# Patient Record
Sex: FEMALE | Race: WHITE
[De-identification: names, ages, dates, MRNs, and addresses within clinical notes are randomized per-mention and may not be internally consistent; named-entity substitution may affect disease eponyms.]

---

## 2017-05-09 ENCOUNTER — HOSPITAL ENCOUNTER (OUTPATIENT)
Dept: HOSPITAL 8 - RAD | Age: 62
Discharge: HOME | End: 2017-05-09
Attending: PHYSICIAN ASSISTANT
Payer: COMMERCIAL

## 2017-05-09 DIAGNOSIS — Z98.890: ICD-10-CM

## 2017-05-09 DIAGNOSIS — M48.07: ICD-10-CM

## 2017-05-09 DIAGNOSIS — M41.84: ICD-10-CM

## 2017-05-09 DIAGNOSIS — M48.04: ICD-10-CM

## 2017-05-09 DIAGNOSIS — Z98.1: ICD-10-CM

## 2017-05-09 DIAGNOSIS — M43.8X4: ICD-10-CM

## 2017-05-09 DIAGNOSIS — M47.894: ICD-10-CM

## 2017-05-09 DIAGNOSIS — M47.897: Primary | ICD-10-CM

## 2017-05-09 PROCEDURE — 72110 X-RAY EXAM L-2 SPINE 4/>VWS: CPT

## 2017-05-09 PROCEDURE — 72072 X-RAY EXAM THORAC SPINE 3VWS: CPT

## 2017-05-30 ENCOUNTER — HOSPITAL ENCOUNTER (EMERGENCY)
Dept: HOSPITAL 8 - ED | Age: 62
Discharge: HOME | End: 2017-05-30
Payer: COMMERCIAL

## 2017-05-30 VITALS — HEIGHT: 68 IN | BODY MASS INDEX: 24.06 KG/M2 | WEIGHT: 158.73 LBS

## 2017-05-30 VITALS — SYSTOLIC BLOOD PRESSURE: 133 MMHG | DIASTOLIC BLOOD PRESSURE: 70 MMHG

## 2017-05-30 DIAGNOSIS — N10: Primary | ICD-10-CM

## 2017-05-30 DIAGNOSIS — I10: ICD-10-CM

## 2017-05-30 DIAGNOSIS — F17.200: ICD-10-CM

## 2017-05-30 LAB
AST SERPL-CCNC: 14 U/L (ref 15–37)
BUN SERPL-MCNC: 19 MG/DL (ref 7–18)
PATH.CAST-FLAG: (no result)
SPERM-FLAG: (no result)
SRC-FLAG: (no result)
XTAL-FLAG: (no result)
YLC-FLAG: (no result)

## 2017-05-30 PROCEDURE — 81001 URINALYSIS AUTO W/SCOPE: CPT

## 2017-05-30 PROCEDURE — 87186 SC STD MICRODIL/AGAR DIL: CPT

## 2017-05-30 PROCEDURE — 96375 TX/PRO/DX INJ NEW DRUG ADDON: CPT

## 2017-05-30 PROCEDURE — 87077 CULTURE AEROBIC IDENTIFY: CPT

## 2017-05-30 PROCEDURE — 83690 ASSAY OF LIPASE: CPT

## 2017-05-30 PROCEDURE — 83605 ASSAY OF LACTIC ACID: CPT

## 2017-05-30 PROCEDURE — 82010 KETONE BODYS QUAN: CPT

## 2017-05-30 PROCEDURE — 96365 THER/PROPH/DIAG IV INF INIT: CPT

## 2017-05-30 PROCEDURE — 74177 CT ABD & PELVIS W/CONTRAST: CPT

## 2017-05-30 PROCEDURE — 80053 COMPREHEN METABOLIC PANEL: CPT

## 2017-05-30 PROCEDURE — 87086 URINE CULTURE/COLONY COUNT: CPT

## 2017-05-30 PROCEDURE — 96376 TX/PRO/DX INJ SAME DRUG ADON: CPT

## 2017-05-30 PROCEDURE — 99285 EMERGENCY DEPT VISIT HI MDM: CPT

## 2017-05-30 PROCEDURE — 85025 COMPLETE CBC W/AUTO DIFF WBC: CPT

## 2017-05-30 PROCEDURE — 96361 HYDRATE IV INFUSION ADD-ON: CPT

## 2017-05-30 PROCEDURE — 36415 COLL VENOUS BLD VENIPUNCTURE: CPT

## 2017-05-30 RX ADMIN — HYDROMORPHONE HYDROCHLORIDE PRN MG: 1 INJECTION, SOLUTION INTRAMUSCULAR; INTRAVENOUS; SUBCUTANEOUS at 12:42

## 2017-05-30 RX ADMIN — HYDROMORPHONE HYDROCHLORIDE PRN MG: 1 INJECTION, SOLUTION INTRAMUSCULAR; INTRAVENOUS; SUBCUTANEOUS at 11:44

## 2017-06-03 ENCOUNTER — HOSPITAL ENCOUNTER (INPATIENT)
Dept: HOSPITAL 8 - ED | Age: 62
LOS: 10 days | Discharge: HOME | DRG: 291 | End: 2017-06-13
Attending: INTERNAL MEDICINE | Admitting: INTERNAL MEDICINE
Payer: COMMERCIAL

## 2017-06-03 VITALS — SYSTOLIC BLOOD PRESSURE: 158 MMHG | DIASTOLIC BLOOD PRESSURE: 75 MMHG

## 2017-06-03 VITALS — SYSTOLIC BLOOD PRESSURE: 158 MMHG | DIASTOLIC BLOOD PRESSURE: 65 MMHG

## 2017-06-03 VITALS — WEIGHT: 166.01 LBS | HEIGHT: 68 IN | BODY MASS INDEX: 25.16 KG/M2

## 2017-06-03 DIAGNOSIS — I13.2: Primary | ICD-10-CM

## 2017-06-03 DIAGNOSIS — E11.649: ICD-10-CM

## 2017-06-03 DIAGNOSIS — F17.210: ICD-10-CM

## 2017-06-03 DIAGNOSIS — N12: ICD-10-CM

## 2017-06-03 DIAGNOSIS — Z66: ICD-10-CM

## 2017-06-03 DIAGNOSIS — D64.9: ICD-10-CM

## 2017-06-03 DIAGNOSIS — E86.0: ICD-10-CM

## 2017-06-03 DIAGNOSIS — E87.2: ICD-10-CM

## 2017-06-03 DIAGNOSIS — E11.00: ICD-10-CM

## 2017-06-03 DIAGNOSIS — Z79.899: ICD-10-CM

## 2017-06-03 DIAGNOSIS — E78.5: ICD-10-CM

## 2017-06-03 DIAGNOSIS — E11.65: ICD-10-CM

## 2017-06-03 DIAGNOSIS — E83.39: ICD-10-CM

## 2017-06-03 DIAGNOSIS — Z95.5: ICD-10-CM

## 2017-06-03 DIAGNOSIS — Z87.440: ICD-10-CM

## 2017-06-03 DIAGNOSIS — I25.10: ICD-10-CM

## 2017-06-03 DIAGNOSIS — N31.9: ICD-10-CM

## 2017-06-03 DIAGNOSIS — Z90.49: ICD-10-CM

## 2017-06-03 DIAGNOSIS — N18.6: ICD-10-CM

## 2017-06-03 DIAGNOSIS — E87.5: ICD-10-CM

## 2017-06-03 DIAGNOSIS — Z87.81: ICD-10-CM

## 2017-06-03 DIAGNOSIS — E11.42: ICD-10-CM

## 2017-06-03 DIAGNOSIS — E87.0: ICD-10-CM

## 2017-06-03 DIAGNOSIS — Z85.41: ICD-10-CM

## 2017-06-03 DIAGNOSIS — K21.9: ICD-10-CM

## 2017-06-03 DIAGNOSIS — K86.1: ICD-10-CM

## 2017-06-03 DIAGNOSIS — E11.22: ICD-10-CM

## 2017-06-03 DIAGNOSIS — N18.3: ICD-10-CM

## 2017-06-03 DIAGNOSIS — Z84.1: ICD-10-CM

## 2017-06-03 DIAGNOSIS — Z79.4: ICD-10-CM

## 2017-06-03 DIAGNOSIS — E43: ICD-10-CM

## 2017-06-03 DIAGNOSIS — Z98.51: ICD-10-CM

## 2017-06-03 DIAGNOSIS — I50.32: ICD-10-CM

## 2017-06-03 DIAGNOSIS — G89.29: ICD-10-CM

## 2017-06-03 DIAGNOSIS — B96.20: ICD-10-CM

## 2017-06-03 DIAGNOSIS — I34.0: ICD-10-CM

## 2017-06-03 DIAGNOSIS — E87.1: ICD-10-CM

## 2017-06-03 LAB
AST SERPL-CCNC: 25 U/L (ref 15–37)
BUN SERPL-MCNC: 47 MG/DL (ref 7–18)

## 2017-06-03 PROCEDURE — 83036 HEMOGLOBIN GLYCOSYLATED A1C: CPT

## 2017-06-03 PROCEDURE — 81001 URINALYSIS AUTO W/SCOPE: CPT

## 2017-06-03 PROCEDURE — 83550 IRON BINDING TEST: CPT

## 2017-06-03 PROCEDURE — 83605 ASSAY OF LACTIC ACID: CPT

## 2017-06-03 PROCEDURE — 83735 ASSAY OF MAGNESIUM: CPT

## 2017-06-03 PROCEDURE — 86707 HEPATITIS BE ANTIBODY: CPT

## 2017-06-03 PROCEDURE — 36558 INSERT TUNNELED CV CATH: CPT

## 2017-06-03 PROCEDURE — 80048 BASIC METABOLIC PNL TOTAL CA: CPT

## 2017-06-03 PROCEDURE — 85610 PROTHROMBIN TIME: CPT

## 2017-06-03 PROCEDURE — 50200 RENAL BIOPSY PERQ: CPT

## 2017-06-03 PROCEDURE — C1750 CATH, HEMODIALYSIS,LONG-TERM: HCPCS

## 2017-06-03 PROCEDURE — 87350 HEPATITIS BE AG IA: CPT

## 2017-06-03 PROCEDURE — 83690 ASSAY OF LIPASE: CPT

## 2017-06-03 PROCEDURE — C1894 INTRO/SHEATH, NON-LASER: HCPCS

## 2017-06-03 PROCEDURE — 84550 ASSAY OF BLOOD/URIC ACID: CPT

## 2017-06-03 PROCEDURE — 89055 LEUKOCYTE ASSESSMENT FECAL: CPT

## 2017-06-03 PROCEDURE — 80053 COMPREHEN METABOLIC PANEL: CPT

## 2017-06-03 PROCEDURE — 83930 ASSAY OF BLOOD OSMOLALITY: CPT

## 2017-06-03 PROCEDURE — 96361 HYDRATE IV INFUSION ADD-ON: CPT

## 2017-06-03 PROCEDURE — 86803 HEPATITIS C AB TEST: CPT

## 2017-06-03 PROCEDURE — 82550 ASSAY OF CK (CPK): CPT

## 2017-06-03 PROCEDURE — 82306 VITAMIN D 25 HYDROXY: CPT

## 2017-06-03 PROCEDURE — 87324 CLOSTRIDIUM AG IA: CPT

## 2017-06-03 PROCEDURE — 86480 TB TEST CELL IMMUN MEASURE: CPT

## 2017-06-03 PROCEDURE — 83880 ASSAY OF NATRIURETIC PEPTIDE: CPT

## 2017-06-03 PROCEDURE — 76770 US EXAM ABDO BACK WALL COMP: CPT

## 2017-06-03 PROCEDURE — 87040 BLOOD CULTURE FOR BACTERIA: CPT

## 2017-06-03 PROCEDURE — 76937 US GUIDE VASCULAR ACCESS: CPT

## 2017-06-03 PROCEDURE — 93005 ELECTROCARDIOGRAM TRACING: CPT

## 2017-06-03 PROCEDURE — 87077 CULTURE AEROBIC IDENTIFY: CPT

## 2017-06-03 PROCEDURE — 83970 ASSAY OF PARATHORMONE: CPT

## 2017-06-03 PROCEDURE — 82962 GLUCOSE BLOOD TEST: CPT

## 2017-06-03 PROCEDURE — 88300 SURGICAL PATH GROSS: CPT

## 2017-06-03 PROCEDURE — 84295 ASSAY OF SERUM SODIUM: CPT

## 2017-06-03 PROCEDURE — 77012 CT SCAN FOR NEEDLE BIOPSY: CPT

## 2017-06-03 PROCEDURE — 86705 HEP B CORE ANTIBODY IGM: CPT

## 2017-06-03 PROCEDURE — 82728 ASSAY OF FERRITIN: CPT

## 2017-06-03 PROCEDURE — 74022 RADEX COMPL AQT ABD SERIES: CPT

## 2017-06-03 PROCEDURE — 87086 URINE CULTURE/COLONY COUNT: CPT

## 2017-06-03 PROCEDURE — 0T9B70Z DRAINAGE OF BLADDER WITH DRAINAGE DEVICE, VIA NATURAL OR ARTIFICIAL OPENING: ICD-10-PCS | Performed by: RADIOLOGY

## 2017-06-03 PROCEDURE — 99156 MOD SED OTH PHYS/QHP 5/>YRS: CPT

## 2017-06-03 PROCEDURE — 96374 THER/PROPH/DIAG INJ IV PUSH: CPT

## 2017-06-03 PROCEDURE — 77001 FLUOROGUIDE FOR VEIN DEVICE: CPT

## 2017-06-03 PROCEDURE — 83540 ASSAY OF IRON: CPT

## 2017-06-03 PROCEDURE — 96375 TX/PRO/DX INJ NEW DRUG ADDON: CPT

## 2017-06-03 PROCEDURE — 36415 COLL VENOUS BLD VENIPUNCTURE: CPT

## 2017-06-03 PROCEDURE — 83935 ASSAY OF URINE OSMOLALITY: CPT

## 2017-06-03 PROCEDURE — 85025 COMPLETE CBC W/AUTO DIFF WBC: CPT

## 2017-06-03 PROCEDURE — S0028 INJECTION, FAMOTIDINE, 20 MG: HCPCS

## 2017-06-03 PROCEDURE — 99157 MOD SED OTHER PHYS/QHP EA: CPT

## 2017-06-03 PROCEDURE — 87186 SC STD MICRODIL/AGAR DIL: CPT

## 2017-06-03 PROCEDURE — 84100 ASSAY OF PHOSPHORUS: CPT

## 2017-06-03 PROCEDURE — 84300 ASSAY OF URINE SODIUM: CPT

## 2017-06-03 PROCEDURE — C1769 GUIDE WIRE: HCPCS

## 2017-06-03 RX ADMIN — METHOCARBAMOL PRN MG: 500 TABLET ORAL at 22:48

## 2017-06-03 RX ADMIN — GABAPENTIN SCH MG: 300 CAPSULE ORAL at 22:48

## 2017-06-03 RX ADMIN — HEPARIN SODIUM SCH UNITS: 5000 INJECTION, SOLUTION INTRAVENOUS; SUBCUTANEOUS at 22:47

## 2017-06-03 RX ADMIN — CYCLOBENZAPRINE HYDROCHLORIDE SCH MG: 10 TABLET, FILM COATED ORAL at 22:48

## 2017-06-03 RX ADMIN — CEFTRIAXONE SCH MLS/HR: 1 INJECTION, SOLUTION INTRAVENOUS at 22:47

## 2017-06-03 RX ADMIN — CARVEDILOL SCH MG: 6.25 TABLET, FILM COATED ORAL at 22:48

## 2017-06-03 RX ADMIN — HYDROCODONE BITARTRATE AND ACETAMINOPHEN PRN TAB: 10; 325 TABLET ORAL at 22:02

## 2017-06-03 RX ADMIN — FENOFIBRATE SCH MG: 145 TABLET, FILM COATED ORAL at 22:48

## 2017-06-03 RX ADMIN — SODIUM CHLORIDE SCH MLS/HR: 0.9 INJECTION, SOLUTION INTRAVENOUS at 22:59

## 2017-06-04 VITALS — SYSTOLIC BLOOD PRESSURE: 118 MMHG | DIASTOLIC BLOOD PRESSURE: 63 MMHG

## 2017-06-04 VITALS — DIASTOLIC BLOOD PRESSURE: 70 MMHG | SYSTOLIC BLOOD PRESSURE: 134 MMHG

## 2017-06-04 VITALS — SYSTOLIC BLOOD PRESSURE: 145 MMHG | DIASTOLIC BLOOD PRESSURE: 69 MMHG

## 2017-06-04 VITALS — DIASTOLIC BLOOD PRESSURE: 73 MMHG | SYSTOLIC BLOOD PRESSURE: 149 MMHG

## 2017-06-04 VITALS — DIASTOLIC BLOOD PRESSURE: 84 MMHG | SYSTOLIC BLOOD PRESSURE: 141 MMHG

## 2017-06-04 LAB
AST SERPL-CCNC: 17 U/L (ref 15–37)
BUN SERPL-MCNC: 50 MG/DL (ref 7–18)

## 2017-06-04 RX ADMIN — AMLODIPINE BESYLATE SCH MG: 5 TABLET ORAL at 10:21

## 2017-06-04 RX ADMIN — ACETAMINOPHEN PRN MG: 325 TABLET, FILM COATED ORAL at 02:52

## 2017-06-04 RX ADMIN — INSULIN HUMAN SCH UNITS: 100 INJECTION, SUSPENSION SUBCUTANEOUS at 10:21

## 2017-06-04 RX ADMIN — HYDROCODONE BITARTRATE AND ACETAMINOPHEN PRN TAB: 10; 325 TABLET ORAL at 14:09

## 2017-06-04 RX ADMIN — SODIUM CHLORIDE, PRESERVATIVE FREE SCH ML: 5 INJECTION INTRAVENOUS at 10:21

## 2017-06-04 RX ADMIN — CARVEDILOL SCH MG: 6.25 TABLET, FILM COATED ORAL at 20:54

## 2017-06-04 RX ADMIN — GABAPENTIN SCH MG: 300 CAPSULE ORAL at 20:55

## 2017-06-04 RX ADMIN — METHOCARBAMOL PRN MG: 500 TABLET ORAL at 22:37

## 2017-06-04 RX ADMIN — HEPARIN SODIUM SCH UNITS: 5000 INJECTION, SOLUTION INTRAVENOUS; SUBCUTANEOUS at 14:09

## 2017-06-04 RX ADMIN — CARVEDILOL SCH MG: 6.25 TABLET, FILM COATED ORAL at 10:21

## 2017-06-04 RX ADMIN — INSULIN HUMAN SCH UNITS: 100 INJECTION, SUSPENSION SUBCUTANEOUS at 11:57

## 2017-06-04 RX ADMIN — HYDROCODONE BITARTRATE AND ACETAMINOPHEN PRN TAB: 10; 325 TABLET ORAL at 22:37

## 2017-06-04 RX ADMIN — DOCUSATE SODIUM 50MG AND SENNOSIDES 8.6MG SCH TAB: 8.6; 5 TABLET, FILM COATED ORAL at 10:21

## 2017-06-04 RX ADMIN — NICOTINE SCH PATCH: 21 PATCH, EXTENDED RELEASE TRANSDERMAL at 20:54

## 2017-06-04 RX ADMIN — SODIUM CHLORIDE SCH MLS/HR: 0.9 INJECTION, SOLUTION INTRAVENOUS at 18:12

## 2017-06-04 RX ADMIN — CEFTRIAXONE SCH MLS/HR: 1 INJECTION, SOLUTION INTRAVENOUS at 22:37

## 2017-06-04 RX ADMIN — CYCLOBENZAPRINE HYDROCHLORIDE SCH MG: 10 TABLET, FILM COATED ORAL at 20:55

## 2017-06-04 RX ADMIN — Medication SCH TAB: at 13:32

## 2017-06-04 RX ADMIN — HEPARIN SODIUM SCH UNITS: 5000 INJECTION, SOLUTION INTRAVENOUS; SUBCUTANEOUS at 05:47

## 2017-06-04 RX ADMIN — FENOFIBRATE SCH MG: 145 TABLET, FILM COATED ORAL at 20:54

## 2017-06-04 RX ADMIN — INSULIN HUMAN SCH UNITS: 100 INJECTION, SUSPENSION SUBCUTANEOUS at 00:44

## 2017-06-04 RX ADMIN — METHOCARBAMOL PRN MG: 500 TABLET ORAL at 20:56

## 2017-06-04 RX ADMIN — SODIUM CHLORIDE, PRESERVATIVE FREE SCH ML: 5 INJECTION INTRAVENOUS at 20:54

## 2017-06-04 RX ADMIN — HEPARIN SODIUM SCH UNITS: 5000 INJECTION, SOLUTION INTRAVENOUS; SUBCUTANEOUS at 22:38

## 2017-06-04 RX ADMIN — SODIUM CHLORIDE SCH MLS/HR: 0.9 INJECTION, SOLUTION INTRAVENOUS at 10:20

## 2017-06-05 VITALS — SYSTOLIC BLOOD PRESSURE: 136 MMHG | DIASTOLIC BLOOD PRESSURE: 75 MMHG

## 2017-06-05 LAB
AST SERPL-CCNC: 19 U/L (ref 15–37)
BUN SERPL-MCNC: 52 MG/DL (ref 7–18)

## 2017-06-05 RX ADMIN — HYDROCODONE BITARTRATE AND ACETAMINOPHEN PRN TAB: 10; 325 TABLET ORAL at 14:30

## 2017-06-05 RX ADMIN — HYDROCODONE BITARTRATE AND ACETAMINOPHEN PRN TAB: 10; 325 TABLET ORAL at 22:45

## 2017-06-05 RX ADMIN — CARVEDILOL SCH MG: 6.25 TABLET, FILM COATED ORAL at 21:00

## 2017-06-05 RX ADMIN — HYDROCODONE BITARTRATE AND ACETAMINOPHEN PRN TAB: 10; 325 TABLET ORAL at 06:30

## 2017-06-05 RX ADMIN — HEPARIN SODIUM SCH UNITS: 5000 INJECTION, SOLUTION INTRAVENOUS; SUBCUTANEOUS at 14:25

## 2017-06-05 RX ADMIN — HEPARIN SODIUM SCH UNITS: 5000 INJECTION, SOLUTION INTRAVENOUS; SUBCUTANEOUS at 06:30

## 2017-06-05 RX ADMIN — CYCLOBENZAPRINE HYDROCHLORIDE SCH MG: 10 TABLET, FILM COATED ORAL at 21:00

## 2017-06-05 RX ADMIN — Medication SCH TAB: at 09:18

## 2017-06-05 RX ADMIN — SODIUM CHLORIDE, PRESERVATIVE FREE SCH ML: 5 INJECTION INTRAVENOUS at 09:18

## 2017-06-05 RX ADMIN — CARVEDILOL SCH MG: 6.25 TABLET, FILM COATED ORAL at 09:18

## 2017-06-05 RX ADMIN — DOCUSATE SODIUM 50MG AND SENNOSIDES 8.6MG SCH TAB: 8.6; 5 TABLET, FILM COATED ORAL at 09:18

## 2017-06-05 RX ADMIN — SODIUM CHLORIDE SCH MLS/HR: 0.9 INJECTION, SOLUTION INTRAVENOUS at 14:30

## 2017-06-05 RX ADMIN — GABAPENTIN SCH MG: 300 CAPSULE ORAL at 21:00

## 2017-06-05 RX ADMIN — AMLODIPINE BESYLATE SCH MG: 5 TABLET ORAL at 09:18

## 2017-06-05 RX ADMIN — NICOTINE SCH PATCH: 21 PATCH, EXTENDED RELEASE TRANSDERMAL at 21:00

## 2017-06-05 RX ADMIN — SODIUM CHLORIDE SCH MLS/HR: 0.9 INJECTION, SOLUTION INTRAVENOUS at 04:39

## 2017-06-05 RX ADMIN — FENOFIBRATE SCH MG: 145 TABLET, FILM COATED ORAL at 21:00

## 2017-06-05 RX ADMIN — METHOCARBAMOL PRN MG: 500 TABLET ORAL at 21:00

## 2017-06-05 RX ADMIN — SODIUM CHLORIDE, PRESERVATIVE FREE SCH ML: 5 INJECTION INTRAVENOUS at 21:00

## 2017-06-06 VITALS — SYSTOLIC BLOOD PRESSURE: 152 MMHG | DIASTOLIC BLOOD PRESSURE: 69 MMHG

## 2017-06-06 VITALS — SYSTOLIC BLOOD PRESSURE: 157 MMHG | DIASTOLIC BLOOD PRESSURE: 81 MMHG

## 2017-06-06 PROCEDURE — 02HV33Z INSERTION OF INFUSION DEVICE INTO SUPERIOR VENA CAVA, PERCUTANEOUS APPROACH: ICD-10-PCS | Performed by: RADIOLOGY

## 2017-06-06 PROCEDURE — B548ZZA ULTRASONOGRAPHY OF SUPERIOR VENA CAVA, GUIDANCE: ICD-10-PCS | Performed by: RADIOLOGY

## 2017-06-06 PROCEDURE — B5181ZA FLUOROSCOPY OF SUPERIOR VENA CAVA USING LOW OSMOLAR CONTRAST, GUIDANCE: ICD-10-PCS | Performed by: RADIOLOGY

## 2017-06-06 PROCEDURE — 5A1D60Z: ICD-10-PCS | Performed by: RADIOLOGY

## 2017-06-06 RX ADMIN — Medication SCH TAB: at 09:00

## 2017-06-06 RX ADMIN — GABAPENTIN SCH MG: 300 CAPSULE ORAL at 22:31

## 2017-06-06 RX ADMIN — SODIUM CHLORIDE SCH MLS/HR: 0.9 INJECTION, SOLUTION INTRAVENOUS at 10:30

## 2017-06-06 RX ADMIN — HEPARIN SODIUM SCH UNITS: 5000 INJECTION, SOLUTION INTRAVENOUS; SUBCUTANEOUS at 14:30

## 2017-06-06 RX ADMIN — DEXTROSE SCH MLS/HR: 10 SOLUTION INTRAVENOUS at 16:01

## 2017-06-06 RX ADMIN — CARVEDILOL SCH MG: 6.25 TABLET, FILM COATED ORAL at 09:00

## 2017-06-06 RX ADMIN — CEFTRIAXONE SCH MLS/HR: 1 INJECTION, SOLUTION INTRAVENOUS at 01:00

## 2017-06-06 RX ADMIN — SODIUM CHLORIDE, PRESERVATIVE FREE SCH ML: 5 INJECTION INTRAVENOUS at 22:31

## 2017-06-06 RX ADMIN — DOCUSATE SODIUM 50MG AND SENNOSIDES 8.6MG SCH TAB: 8.6; 5 TABLET, FILM COATED ORAL at 09:00

## 2017-06-06 RX ADMIN — CALCIUM ACETATE SCH MG: 667 CAPSULE ORAL at 17:00

## 2017-06-06 RX ADMIN — SODIUM CHLORIDE SCH MLS/HR: 0.9 INJECTION, SOLUTION INTRAVENOUS at 00:30

## 2017-06-06 RX ADMIN — AMLODIPINE BESYLATE SCH MG: 5 TABLET ORAL at 09:00

## 2017-06-06 RX ADMIN — CALCIUM ACETATE SCH MG: 667 CAPSULE ORAL at 16:01

## 2017-06-06 RX ADMIN — FENOFIBRATE SCH MG: 145 TABLET, FILM COATED ORAL at 22:31

## 2017-06-06 RX ADMIN — SODIUM CHLORIDE, PRESERVATIVE FREE SCH ML: 5 INJECTION INTRAVENOUS at 09:00

## 2017-06-06 RX ADMIN — HEPARIN SODIUM SCH UNITS: 5000 INJECTION, SOLUTION INTRAVENOUS; SUBCUTANEOUS at 22:31

## 2017-06-06 RX ADMIN — CEFTRIAXONE SCH MLS/HR: 1 INJECTION, SOLUTION INTRAVENOUS at 22:32

## 2017-06-06 RX ADMIN — HYDROCODONE BITARTRATE AND ACETAMINOPHEN PRN TAB: 10; 325 TABLET ORAL at 15:32

## 2017-06-06 RX ADMIN — HEPARIN SODIUM SCH UNITS: 5000 INJECTION, SOLUTION INTRAVENOUS; SUBCUTANEOUS at 01:00

## 2017-06-06 RX ADMIN — CALCIUM ACETATE SCH MG: 667 CAPSULE ORAL at 17:58

## 2017-06-06 RX ADMIN — CARVEDILOL SCH MG: 6.25 TABLET, FILM COATED ORAL at 22:31

## 2017-06-06 RX ADMIN — NICOTINE SCH PATCH: 21 PATCH, EXTENDED RELEASE TRANSDERMAL at 22:32

## 2017-06-06 RX ADMIN — HEPARIN SODIUM SCH UNITS: 5000 INJECTION, SOLUTION INTRAVENOUS; SUBCUTANEOUS at 06:30

## 2017-06-06 RX ADMIN — ACETAMINOPHEN PRN MG: 325 TABLET, FILM COATED ORAL at 04:40

## 2017-06-06 RX ADMIN — DEXTROSE SCH MLS/HR: 10 SOLUTION INTRAVENOUS at 13:00

## 2017-06-06 RX ADMIN — CYCLOBENZAPRINE HYDROCHLORIDE SCH MG: 10 TABLET, FILM COATED ORAL at 22:31

## 2017-06-07 VITALS — DIASTOLIC BLOOD PRESSURE: 75 MMHG | SYSTOLIC BLOOD PRESSURE: 146 MMHG

## 2017-06-07 VITALS — SYSTOLIC BLOOD PRESSURE: 143 MMHG | DIASTOLIC BLOOD PRESSURE: 62 MMHG

## 2017-06-07 VITALS — SYSTOLIC BLOOD PRESSURE: 139 MMHG | DIASTOLIC BLOOD PRESSURE: 64 MMHG

## 2017-06-07 VITALS — DIASTOLIC BLOOD PRESSURE: 92 MMHG | SYSTOLIC BLOOD PRESSURE: 156 MMHG

## 2017-06-07 LAB — BUN SERPL-MCNC: 28 MG/DL (ref 7–18)

## 2017-06-07 RX ADMIN — HEPARIN SODIUM SCH UNITS: 5000 INJECTION, SOLUTION INTRAVENOUS; SUBCUTANEOUS at 14:30

## 2017-06-07 RX ADMIN — HEPARIN SODIUM SCH UNITS: 5000 INJECTION, SOLUTION INTRAVENOUS; SUBCUTANEOUS at 22:09

## 2017-06-07 RX ADMIN — FENOFIBRATE SCH MG: 145 TABLET, FILM COATED ORAL at 21:59

## 2017-06-07 RX ADMIN — SODIUM CHLORIDE, PRESERVATIVE FREE SCH ML: 5 INJECTION INTRAVENOUS at 21:00

## 2017-06-07 RX ADMIN — DEXTROSE SCH MLS/HR: 10 SOLUTION INTRAVENOUS at 16:00

## 2017-06-07 RX ADMIN — CARVEDILOL SCH MG: 6.25 TABLET, FILM COATED ORAL at 12:10

## 2017-06-07 RX ADMIN — NICOTINE SCH PATCH: 21 PATCH, EXTENDED RELEASE TRANSDERMAL at 08:51

## 2017-06-07 RX ADMIN — CEFTRIAXONE SCH MLS/HR: 1 INJECTION, SOLUTION INTRAVENOUS at 21:59

## 2017-06-07 RX ADMIN — DEXTROSE SCH MLS/HR: 10 SOLUTION INTRAVENOUS at 05:25

## 2017-06-07 RX ADMIN — CALCIUM ACETATE SCH MG: 667 CAPSULE ORAL at 12:00

## 2017-06-07 RX ADMIN — CALCIUM ACETATE SCH MG: 667 CAPSULE ORAL at 16:53

## 2017-06-07 RX ADMIN — SODIUM CHLORIDE, PRESERVATIVE FREE SCH ML: 5 INJECTION INTRAVENOUS at 08:53

## 2017-06-07 RX ADMIN — Medication SCH TAB: at 08:51

## 2017-06-07 RX ADMIN — CYCLOBENZAPRINE HYDROCHLORIDE SCH MG: 10 TABLET, FILM COATED ORAL at 21:59

## 2017-06-07 RX ADMIN — CARVEDILOL SCH MG: 6.25 TABLET, FILM COATED ORAL at 21:59

## 2017-06-07 RX ADMIN — CALCIUM ACETATE SCH MG: 667 CAPSULE ORAL at 08:56

## 2017-06-07 RX ADMIN — AMLODIPINE BESYLATE SCH MG: 5 TABLET ORAL at 12:10

## 2017-06-07 RX ADMIN — DOCUSATE SODIUM 50MG AND SENNOSIDES 8.6MG SCH TAB: 8.6; 5 TABLET, FILM COATED ORAL at 08:51

## 2017-06-07 RX ADMIN — HEPARIN SODIUM SCH UNITS: 5000 INJECTION, SOLUTION INTRAVENOUS; SUBCUTANEOUS at 05:25

## 2017-06-07 RX ADMIN — GABAPENTIN SCH MG: 300 CAPSULE ORAL at 21:59

## 2017-06-08 VITALS — SYSTOLIC BLOOD PRESSURE: 129 MMHG | DIASTOLIC BLOOD PRESSURE: 62 MMHG

## 2017-06-08 VITALS — SYSTOLIC BLOOD PRESSURE: 137 MMHG | DIASTOLIC BLOOD PRESSURE: 68 MMHG

## 2017-06-08 VITALS — SYSTOLIC BLOOD PRESSURE: 136 MMHG | DIASTOLIC BLOOD PRESSURE: 70 MMHG

## 2017-06-08 VITALS — DIASTOLIC BLOOD PRESSURE: 71 MMHG | SYSTOLIC BLOOD PRESSURE: 149 MMHG

## 2017-06-08 VITALS — DIASTOLIC BLOOD PRESSURE: 71 MMHG | SYSTOLIC BLOOD PRESSURE: 136 MMHG

## 2017-06-08 PROCEDURE — 0TB03ZX EXCISION OF RIGHT KIDNEY, PERCUTANEOUS APPROACH, DIAGNOSTIC: ICD-10-PCS | Performed by: RADIOLOGY

## 2017-06-08 RX ADMIN — NICOTINE SCH PATCH: 21 PATCH, EXTENDED RELEASE TRANSDERMAL at 10:24

## 2017-06-08 RX ADMIN — CARVEDILOL SCH MG: 6.25 TABLET, FILM COATED ORAL at 21:24

## 2017-06-08 RX ADMIN — DOCUSATE SODIUM 50MG AND SENNOSIDES 8.6MG SCH TAB: 8.6; 5 TABLET, FILM COATED ORAL at 10:17

## 2017-06-08 RX ADMIN — AMLODIPINE BESYLATE SCH MG: 5 TABLET ORAL at 10:18

## 2017-06-08 RX ADMIN — FENOFIBRATE SCH MG: 145 TABLET, FILM COATED ORAL at 21:24

## 2017-06-08 RX ADMIN — GABAPENTIN SCH MG: 300 CAPSULE ORAL at 21:24

## 2017-06-08 RX ADMIN — Medication SCH TAB: at 10:17

## 2017-06-08 RX ADMIN — CALCIUM ACETATE SCH MG: 667 CAPSULE ORAL at 10:18

## 2017-06-08 RX ADMIN — SODIUM CHLORIDE, PRESERVATIVE FREE SCH ML: 5 INJECTION INTRAVENOUS at 09:00

## 2017-06-08 RX ADMIN — CALCIUM ACETATE SCH MG: 667 CAPSULE ORAL at 13:00

## 2017-06-08 RX ADMIN — CEFTRIAXONE SCH MLS/HR: 1 INJECTION, SOLUTION INTRAVENOUS at 22:35

## 2017-06-08 RX ADMIN — CARVEDILOL SCH MG: 6.25 TABLET, FILM COATED ORAL at 10:18

## 2017-06-08 RX ADMIN — SODIUM CHLORIDE, PRESERVATIVE FREE SCH ML: 5 INJECTION INTRAVENOUS at 21:25

## 2017-06-08 RX ADMIN — HEPARIN SODIUM SCH UNITS: 5000 INJECTION, SOLUTION INTRAVENOUS; SUBCUTANEOUS at 06:29

## 2017-06-08 RX ADMIN — CYCLOBENZAPRINE HYDROCHLORIDE SCH MG: 10 TABLET, FILM COATED ORAL at 21:24

## 2017-06-08 RX ADMIN — CALCIUM ACETATE SCH MG: 667 CAPSULE ORAL at 18:38

## 2017-06-08 RX ADMIN — HEPARIN SODIUM SCH UNITS: 5000 INJECTION, SOLUTION INTRAVENOUS; SUBCUTANEOUS at 18:38

## 2017-06-09 VITALS — DIASTOLIC BLOOD PRESSURE: 82 MMHG | SYSTOLIC BLOOD PRESSURE: 132 MMHG

## 2017-06-09 VITALS — SYSTOLIC BLOOD PRESSURE: 159 MMHG | DIASTOLIC BLOOD PRESSURE: 68 MMHG

## 2017-06-09 VITALS — DIASTOLIC BLOOD PRESSURE: 61 MMHG | SYSTOLIC BLOOD PRESSURE: 132 MMHG

## 2017-06-09 VITALS — SYSTOLIC BLOOD PRESSURE: 122 MMHG | DIASTOLIC BLOOD PRESSURE: 73 MMHG

## 2017-06-09 LAB
AST SERPL-CCNC: 18 U/L (ref 15–37)
BUN SERPL-MCNC: 43 MG/DL (ref 7–18)
BUN SERPL-MCNC: 60 MG/DL (ref 7–18)

## 2017-06-09 RX ADMIN — HEPARIN SODIUM SCH UNITS: 5000 INJECTION, SOLUTION INTRAVENOUS; SUBCUTANEOUS at 17:31

## 2017-06-09 RX ADMIN — CYCLOBENZAPRINE HYDROCHLORIDE SCH MG: 10 TABLET, FILM COATED ORAL at 20:58

## 2017-06-09 RX ADMIN — AMLODIPINE BESYLATE SCH MG: 5 TABLET ORAL at 12:21

## 2017-06-09 RX ADMIN — NICOTINE SCH PATCH: 21 PATCH, EXTENDED RELEASE TRANSDERMAL at 12:22

## 2017-06-09 RX ADMIN — FENOFIBRATE SCH MG: 145 TABLET, FILM COATED ORAL at 20:58

## 2017-06-09 RX ADMIN — CALCIUM ACETATE SCH MG: 667 CAPSULE ORAL at 12:20

## 2017-06-09 RX ADMIN — DOCUSATE SODIUM 50MG AND SENNOSIDES 8.6MG SCH TAB: 8.6; 5 TABLET, FILM COATED ORAL at 12:21

## 2017-06-09 RX ADMIN — Medication SCH TAB: at 12:21

## 2017-06-09 RX ADMIN — CALCIUM ACETATE SCH MG: 667 CAPSULE ORAL at 17:31

## 2017-06-09 RX ADMIN — CARVEDILOL SCH MG: 6.25 TABLET, FILM COATED ORAL at 12:20

## 2017-06-09 RX ADMIN — CEFTRIAXONE SCH MLS/HR: 1 INJECTION, SOLUTION INTRAVENOUS at 22:48

## 2017-06-09 RX ADMIN — SODIUM CHLORIDE, PRESERVATIVE FREE SCH ML: 5 INJECTION INTRAVENOUS at 12:20

## 2017-06-09 RX ADMIN — SODIUM CHLORIDE, PRESERVATIVE FREE SCH ML: 5 INJECTION INTRAVENOUS at 20:59

## 2017-06-09 RX ADMIN — CALCIUM ACETATE SCH MG: 667 CAPSULE ORAL at 08:00

## 2017-06-09 RX ADMIN — HEPARIN SODIUM SCH UNITS: 5000 INJECTION, SOLUTION INTRAVENOUS; SUBCUTANEOUS at 09:00

## 2017-06-09 RX ADMIN — CARVEDILOL SCH MG: 6.25 TABLET, FILM COATED ORAL at 20:58

## 2017-06-09 RX ADMIN — METHOCARBAMOL PRN MG: 500 TABLET ORAL at 20:58

## 2017-06-09 RX ADMIN — HYDROCODONE BITARTRATE AND ACETAMINOPHEN PRN TAB: 10; 325 TABLET ORAL at 22:48

## 2017-06-09 RX ADMIN — GABAPENTIN SCH MG: 300 CAPSULE ORAL at 20:58

## 2017-06-10 VITALS — DIASTOLIC BLOOD PRESSURE: 65 MMHG | SYSTOLIC BLOOD PRESSURE: 141 MMHG

## 2017-06-10 VITALS — SYSTOLIC BLOOD PRESSURE: 153 MMHG | DIASTOLIC BLOOD PRESSURE: 68 MMHG

## 2017-06-10 VITALS — DIASTOLIC BLOOD PRESSURE: 62 MMHG | SYSTOLIC BLOOD PRESSURE: 144 MMHG

## 2017-06-10 VITALS — DIASTOLIC BLOOD PRESSURE: 74 MMHG | SYSTOLIC BLOOD PRESSURE: 146 MMHG

## 2017-06-10 RX ADMIN — HYDROCODONE BITARTRATE AND ACETAMINOPHEN PRN TAB: 10; 325 TABLET ORAL at 08:44

## 2017-06-10 RX ADMIN — SODIUM CHLORIDE, PRESERVATIVE FREE SCH ML: 5 INJECTION INTRAVENOUS at 08:45

## 2017-06-10 RX ADMIN — HEPARIN SODIUM SCH UNITS: 5000 INJECTION, SOLUTION INTRAVENOUS; SUBCUTANEOUS at 00:47

## 2017-06-10 RX ADMIN — FENOFIBRATE SCH MG: 145 TABLET, FILM COATED ORAL at 21:40

## 2017-06-10 RX ADMIN — Medication SCH TAB: at 08:45

## 2017-06-10 RX ADMIN — NICOTINE SCH PATCH: 21 PATCH, EXTENDED RELEASE TRANSDERMAL at 08:44

## 2017-06-10 RX ADMIN — GABAPENTIN SCH MG: 300 CAPSULE ORAL at 21:40

## 2017-06-10 RX ADMIN — AMLODIPINE BESYLATE SCH MG: 5 TABLET ORAL at 08:44

## 2017-06-10 RX ADMIN — CEFTRIAXONE SCH MLS/HR: 1 INJECTION, SOLUTION INTRAVENOUS at 23:17

## 2017-06-10 RX ADMIN — CARVEDILOL SCH MG: 6.25 TABLET, FILM COATED ORAL at 08:44

## 2017-06-10 RX ADMIN — CALCIUM ACETATE SCH MG: 667 CAPSULE ORAL at 17:47

## 2017-06-10 RX ADMIN — CALCIUM ACETATE SCH MG: 667 CAPSULE ORAL at 12:34

## 2017-06-10 RX ADMIN — HEPARIN SODIUM SCH UNITS: 5000 INJECTION, SOLUTION INTRAVENOUS; SUBCUTANEOUS at 08:44

## 2017-06-10 RX ADMIN — CYCLOBENZAPRINE HYDROCHLORIDE SCH MG: 10 TABLET, FILM COATED ORAL at 21:40

## 2017-06-10 RX ADMIN — CARVEDILOL SCH MG: 6.25 TABLET, FILM COATED ORAL at 21:40

## 2017-06-10 RX ADMIN — CALCIUM ACETATE SCH MG: 667 CAPSULE ORAL at 08:44

## 2017-06-10 RX ADMIN — SODIUM CHLORIDE, PRESERVATIVE FREE SCH ML: 5 INJECTION INTRAVENOUS at 21:46

## 2017-06-10 RX ADMIN — HEPARIN SODIUM SCH UNITS: 5000 INJECTION, SOLUTION INTRAVENOUS; SUBCUTANEOUS at 17:47

## 2017-06-10 RX ADMIN — DOCUSATE SODIUM 50MG AND SENNOSIDES 8.6MG SCH TAB: 8.6; 5 TABLET, FILM COATED ORAL at 08:45

## 2017-06-11 VITALS — SYSTOLIC BLOOD PRESSURE: 146 MMHG | DIASTOLIC BLOOD PRESSURE: 80 MMHG

## 2017-06-11 VITALS — SYSTOLIC BLOOD PRESSURE: 148 MMHG | DIASTOLIC BLOOD PRESSURE: 71 MMHG

## 2017-06-11 VITALS — DIASTOLIC BLOOD PRESSURE: 72 MMHG | SYSTOLIC BLOOD PRESSURE: 139 MMHG

## 2017-06-11 VITALS — DIASTOLIC BLOOD PRESSURE: 77 MMHG | SYSTOLIC BLOOD PRESSURE: 147 MMHG

## 2017-06-11 LAB
AST SERPL-CCNC: 15 U/L (ref 15–37)
BUN SERPL-MCNC: 34 MG/DL (ref 7–18)
TIBC SERPL-MCNC: 369 MCG/DL (ref 250–450)

## 2017-06-11 RX ADMIN — HEPARIN SODIUM SCH UNITS: 5000 INJECTION, SOLUTION INTRAVENOUS; SUBCUTANEOUS at 17:58

## 2017-06-11 RX ADMIN — HYDROCODONE BITARTRATE AND ACETAMINOPHEN PRN TAB: 10; 325 TABLET ORAL at 23:17

## 2017-06-11 RX ADMIN — HEPARIN SODIUM SCH UNITS: 5000 INJECTION, SOLUTION INTRAVENOUS; SUBCUTANEOUS at 08:37

## 2017-06-11 RX ADMIN — FENOFIBRATE SCH MG: 145 TABLET, FILM COATED ORAL at 20:08

## 2017-06-11 RX ADMIN — CYCLOBENZAPRINE HYDROCHLORIDE SCH MG: 10 TABLET, FILM COATED ORAL at 20:08

## 2017-06-11 RX ADMIN — CALCIUM ACETATE SCH MG: 667 CAPSULE ORAL at 17:57

## 2017-06-11 RX ADMIN — Medication SCH TAB: at 08:38

## 2017-06-11 RX ADMIN — SODIUM CHLORIDE, PRESERVATIVE FREE SCH ML: 5 INJECTION INTRAVENOUS at 23:18

## 2017-06-11 RX ADMIN — CARVEDILOL SCH MG: 6.25 TABLET, FILM COATED ORAL at 20:09

## 2017-06-11 RX ADMIN — CALCIUM ACETATE SCH MG: 667 CAPSULE ORAL at 08:38

## 2017-06-11 RX ADMIN — GABAPENTIN SCH MG: 300 CAPSULE ORAL at 20:08

## 2017-06-11 RX ADMIN — AMLODIPINE BESYLATE SCH MG: 5 TABLET ORAL at 08:38

## 2017-06-11 RX ADMIN — CALCIUM ACETATE SCH MG: 667 CAPSULE ORAL at 12:32

## 2017-06-11 RX ADMIN — SODIUM CHLORIDE, PRESERVATIVE FREE SCH ML: 5 INJECTION INTRAVENOUS at 20:09

## 2017-06-11 RX ADMIN — CARVEDILOL SCH MG: 6.25 TABLET, FILM COATED ORAL at 08:38

## 2017-06-11 RX ADMIN — HEPARIN SODIUM SCH UNITS: 5000 INJECTION, SOLUTION INTRAVENOUS; SUBCUTANEOUS at 01:19

## 2017-06-11 RX ADMIN — DOCUSATE SODIUM 50MG AND SENNOSIDES 8.6MG SCH TAB: 8.6; 5 TABLET, FILM COATED ORAL at 08:38

## 2017-06-11 RX ADMIN — SODIUM CHLORIDE, PRESERVATIVE FREE SCH ML: 5 INJECTION INTRAVENOUS at 08:39

## 2017-06-11 RX ADMIN — CEFTRIAXONE SCH MLS/HR: 1 INJECTION, SOLUTION INTRAVENOUS at 23:17

## 2017-06-11 RX ADMIN — NICOTINE SCH PATCH: 21 PATCH, EXTENDED RELEASE TRANSDERMAL at 08:37

## 2017-06-12 VITALS — SYSTOLIC BLOOD PRESSURE: 150 MMHG | DIASTOLIC BLOOD PRESSURE: 77 MMHG

## 2017-06-12 VITALS — SYSTOLIC BLOOD PRESSURE: 136 MMHG | DIASTOLIC BLOOD PRESSURE: 70 MMHG

## 2017-06-12 VITALS — SYSTOLIC BLOOD PRESSURE: 152 MMHG | DIASTOLIC BLOOD PRESSURE: 78 MMHG

## 2017-06-12 VITALS — DIASTOLIC BLOOD PRESSURE: 74 MMHG | SYSTOLIC BLOOD PRESSURE: 154 MMHG

## 2017-06-12 VITALS — SYSTOLIC BLOOD PRESSURE: 149 MMHG | DIASTOLIC BLOOD PRESSURE: 74 MMHG

## 2017-06-12 VITALS — SYSTOLIC BLOOD PRESSURE: 129 MMHG | DIASTOLIC BLOOD PRESSURE: 63 MMHG

## 2017-06-12 LAB
AST SERPL-CCNC: 12 U/L (ref 15–37)
BUN SERPL-MCNC: 42 MG/DL (ref 7–18)

## 2017-06-12 RX ADMIN — SODIUM CHLORIDE, PRESERVATIVE FREE SCH ML: 5 INJECTION INTRAVENOUS at 08:24

## 2017-06-12 RX ADMIN — CYCLOBENZAPRINE HYDROCHLORIDE SCH MG: 10 TABLET, FILM COATED ORAL at 21:41

## 2017-06-12 RX ADMIN — NICOTINE SCH PATCH: 21 PATCH, EXTENDED RELEASE TRANSDERMAL at 08:23

## 2017-06-12 RX ADMIN — HEPARIN SODIUM SCH UNITS: 5000 INJECTION, SOLUTION INTRAVENOUS; SUBCUTANEOUS at 17:14

## 2017-06-12 RX ADMIN — CARVEDILOL SCH MG: 6.25 TABLET, FILM COATED ORAL at 21:42

## 2017-06-12 RX ADMIN — CEFTRIAXONE SCH MLS/HR: 1 INJECTION, SOLUTION INTRAVENOUS at 23:32

## 2017-06-12 RX ADMIN — CALCIUM ACETATE SCH MG: 667 CAPSULE ORAL at 08:28

## 2017-06-12 RX ADMIN — FENOFIBRATE SCH MG: 145 TABLET, FILM COATED ORAL at 21:41

## 2017-06-12 RX ADMIN — CALCIUM ACETATE SCH MG: 667 CAPSULE ORAL at 17:14

## 2017-06-12 RX ADMIN — HEPARIN SODIUM SCH UNITS: 5000 INJECTION, SOLUTION INTRAVENOUS; SUBCUTANEOUS at 08:24

## 2017-06-12 RX ADMIN — SODIUM CHLORIDE, PRESERVATIVE FREE SCH ML: 5 INJECTION INTRAVENOUS at 21:42

## 2017-06-12 RX ADMIN — HYDROCODONE BITARTRATE AND ACETAMINOPHEN PRN TAB: 10; 325 TABLET ORAL at 23:32

## 2017-06-12 RX ADMIN — HYDROCODONE BITARTRATE AND ACETAMINOPHEN PRN TAB: 10; 325 TABLET ORAL at 10:38

## 2017-06-12 RX ADMIN — DOCUSATE SODIUM 50MG AND SENNOSIDES 8.6MG SCH TAB: 8.6; 5 TABLET, FILM COATED ORAL at 08:24

## 2017-06-12 RX ADMIN — Medication SCH TAB: at 08:23

## 2017-06-12 RX ADMIN — HEPARIN SODIUM SCH UNITS: 5000 INJECTION, SOLUTION INTRAVENOUS; SUBCUTANEOUS at 02:14

## 2017-06-12 RX ADMIN — CALCIUM ACETATE SCH MG: 667 CAPSULE ORAL at 12:07

## 2017-06-12 RX ADMIN — CARVEDILOL SCH MG: 6.25 TABLET, FILM COATED ORAL at 10:46

## 2017-06-12 RX ADMIN — GABAPENTIN SCH MG: 300 CAPSULE ORAL at 21:41

## 2017-06-12 RX ADMIN — AMLODIPINE BESYLATE SCH MG: 5 TABLET ORAL at 08:23

## 2017-06-13 VITALS — SYSTOLIC BLOOD PRESSURE: 133 MMHG | DIASTOLIC BLOOD PRESSURE: 75 MMHG

## 2017-06-13 VITALS — SYSTOLIC BLOOD PRESSURE: 131 MMHG | DIASTOLIC BLOOD PRESSURE: 72 MMHG

## 2017-06-13 LAB
AST SERPL-CCNC: 6 U/L (ref 15–37)
BUN SERPL-MCNC: 50 MG/DL (ref 7–18)

## 2017-06-13 RX ADMIN — AMLODIPINE BESYLATE SCH MG: 5 TABLET ORAL at 08:15

## 2017-06-13 RX ADMIN — HEPARIN SODIUM SCH UNITS: 5000 INJECTION, SOLUTION INTRAVENOUS; SUBCUTANEOUS at 01:00

## 2017-06-13 RX ADMIN — SODIUM CHLORIDE, PRESERVATIVE FREE SCH ML: 5 INJECTION INTRAVENOUS at 08:17

## 2017-06-13 RX ADMIN — CALCIUM ACETATE SCH MG: 667 CAPSULE ORAL at 08:15

## 2017-06-13 RX ADMIN — HEPARIN SODIUM SCH UNITS: 5000 INJECTION, SOLUTION INTRAVENOUS; SUBCUTANEOUS at 08:15

## 2017-06-13 RX ADMIN — NICOTINE SCH PATCH: 21 PATCH, EXTENDED RELEASE TRANSDERMAL at 08:16

## 2017-06-13 RX ADMIN — Medication SCH TAB: at 08:15

## 2017-06-13 RX ADMIN — CARVEDILOL SCH MG: 6.25 TABLET, FILM COATED ORAL at 08:30

## 2017-06-13 RX ADMIN — DOCUSATE SODIUM 50MG AND SENNOSIDES 8.6MG SCH TAB: 8.6; 5 TABLET, FILM COATED ORAL at 08:18

## 2017-12-26 ENCOUNTER — HOSPITAL ENCOUNTER (EMERGENCY)
Dept: HOSPITAL 8 - ED | Age: 62
Discharge: HOME | End: 2017-12-26
Payer: COMMERCIAL

## 2017-12-26 VITALS — DIASTOLIC BLOOD PRESSURE: 71 MMHG | SYSTOLIC BLOOD PRESSURE: 132 MMHG

## 2017-12-26 VITALS — WEIGHT: 152.12 LBS | BODY MASS INDEX: 23.05 KG/M2 | HEIGHT: 68 IN

## 2017-12-26 DIAGNOSIS — F17.210: ICD-10-CM

## 2017-12-26 DIAGNOSIS — I12.9: Primary | ICD-10-CM

## 2017-12-26 DIAGNOSIS — Z90.49: ICD-10-CM

## 2017-12-26 DIAGNOSIS — N18.2: ICD-10-CM

## 2017-12-26 DIAGNOSIS — K52.89: ICD-10-CM

## 2017-12-26 LAB
AST SERPL-CCNC: 19 U/L (ref 15–37)
BUN SERPL-MCNC: 17 MG/DL (ref 7–18)
HCT VFR BLD CALC: 42.8 % (ref 34.6–47.8)
HGB BLD-MCNC: 14.5 G/DL (ref 11.7–16.4)
WBC # BLD AUTO: 9.8 X10^3/UL (ref 3.4–10)

## 2017-12-26 PROCEDURE — 96361 HYDRATE IV INFUSION ADD-ON: CPT

## 2017-12-26 PROCEDURE — 93005 ELECTROCARDIOGRAM TRACING: CPT

## 2017-12-26 PROCEDURE — 99285 EMERGENCY DEPT VISIT HI MDM: CPT

## 2017-12-26 PROCEDURE — 96360 HYDRATION IV INFUSION INIT: CPT

## 2017-12-26 PROCEDURE — 74022 RADEX COMPL AQT ABD SERIES: CPT

## 2017-12-26 PROCEDURE — 80053 COMPREHEN METABOLIC PANEL: CPT

## 2017-12-26 PROCEDURE — 81001 URINALYSIS AUTO W/SCOPE: CPT

## 2017-12-26 PROCEDURE — 87106 FUNGI IDENTIFICATION YEAST: CPT

## 2017-12-26 PROCEDURE — 85025 COMPLETE CBC W/AUTO DIFF WBC: CPT

## 2017-12-26 PROCEDURE — 87086 URINE CULTURE/COLONY COUNT: CPT

## 2017-12-26 PROCEDURE — 36415 COLL VENOUS BLD VENIPUNCTURE: CPT

## 2017-12-27 ENCOUNTER — HOSPITAL ENCOUNTER (INPATIENT)
Dept: HOSPITAL 8 - ED | Age: 62
LOS: 3 days | Discharge: HOME | DRG: 682 | End: 2017-12-30
Attending: HOSPITALIST | Admitting: INTERNAL MEDICINE
Payer: COMMERCIAL

## 2017-12-27 VITALS — HEIGHT: 68 IN | BODY MASS INDEX: 25.86 KG/M2 | WEIGHT: 170.64 LBS

## 2017-12-27 VITALS — SYSTOLIC BLOOD PRESSURE: 114 MMHG | DIASTOLIC BLOOD PRESSURE: 72 MMHG

## 2017-12-27 VITALS — DIASTOLIC BLOOD PRESSURE: 66 MMHG | SYSTOLIC BLOOD PRESSURE: 110 MMHG

## 2017-12-27 VITALS — SYSTOLIC BLOOD PRESSURE: 114 MMHG | DIASTOLIC BLOOD PRESSURE: 64 MMHG

## 2017-12-27 DIAGNOSIS — J15.9: ICD-10-CM

## 2017-12-27 DIAGNOSIS — Z85.41: ICD-10-CM

## 2017-12-27 DIAGNOSIS — E11.649: ICD-10-CM

## 2017-12-27 DIAGNOSIS — E11.40: ICD-10-CM

## 2017-12-27 DIAGNOSIS — Z90.710: ICD-10-CM

## 2017-12-27 DIAGNOSIS — E43: ICD-10-CM

## 2017-12-27 DIAGNOSIS — E78.5: ICD-10-CM

## 2017-12-27 DIAGNOSIS — F41.9: ICD-10-CM

## 2017-12-27 DIAGNOSIS — K52.9: ICD-10-CM

## 2017-12-27 DIAGNOSIS — E11.22: ICD-10-CM

## 2017-12-27 DIAGNOSIS — Z90.49: ICD-10-CM

## 2017-12-27 DIAGNOSIS — I13.10: ICD-10-CM

## 2017-12-27 DIAGNOSIS — N18.3: ICD-10-CM

## 2017-12-27 DIAGNOSIS — Z79.4: ICD-10-CM

## 2017-12-27 DIAGNOSIS — Z66: ICD-10-CM

## 2017-12-27 DIAGNOSIS — K86.1: ICD-10-CM

## 2017-12-27 DIAGNOSIS — Z87.440: ICD-10-CM

## 2017-12-27 DIAGNOSIS — J96.01: ICD-10-CM

## 2017-12-27 DIAGNOSIS — E86.0: ICD-10-CM

## 2017-12-27 DIAGNOSIS — N17.0: Primary | ICD-10-CM

## 2017-12-27 DIAGNOSIS — E11.65: ICD-10-CM

## 2017-12-27 DIAGNOSIS — E11.21: ICD-10-CM

## 2017-12-27 DIAGNOSIS — N31.9: ICD-10-CM

## 2017-12-27 DIAGNOSIS — E87.6: ICD-10-CM

## 2017-12-27 DIAGNOSIS — F17.210: ICD-10-CM

## 2017-12-27 LAB
ALBUMIN SERPL-MCNC: 2.4 G/DL (ref 3.4–5)
ALP SERPL-CCNC: 112 U/L (ref 45–117)
ALT SERPL-CCNC: 12 U/L (ref 12–78)
ANION GAP SERPL CALC-SCNC: 10 MMOL/L (ref 5–15)
BASOPHILS # BLD AUTO: 0.02 X10^3/UL (ref 0–0.1)
BASOPHILS NFR BLD AUTO: 0 % (ref 0–1)
BILIRUB SERPL-MCNC: 0.5 MG/DL (ref 0.2–1)
CALCIUM SERPL-MCNC: 7.4 MG/DL (ref 8.5–10.1)
CHLORIDE SERPL-SCNC: 105 MMOL/L (ref 98–107)
CREAT SERPL-MCNC: 2.09 MG/DL (ref 0.55–1.02)
CULTURE INDICATED?: YES
EOSINOPHIL # BLD AUTO: 0.05 X10^3/UL (ref 0–0.4)
EOSINOPHIL NFR BLD AUTO: 1 % (ref 1–7)
ERYTHROCYTE [DISTWIDTH] IN BLOOD BY AUTOMATED COUNT: 15.8 % (ref 9.6–15.2)
LYMPHOCYTES # BLD AUTO: 1.25 X10^3/UL (ref 1–3.4)
LYMPHOCYTES NFR BLD AUTO: 16 % (ref 22–44)
MCH RBC QN AUTO: 31.5 PG (ref 27–34.8)
MCHC RBC AUTO-ENTMCNC: 33.5 G/DL (ref 32.4–35.8)
MCV RBC AUTO: 94.1 FL (ref 80–100)
MD: NO
MICROSCOPIC: (no result)
MONOCYTES # BLD AUTO: 0.21 X10^3/UL (ref 0.2–0.8)
MONOCYTES NFR BLD AUTO: 3 % (ref 2–9)
NEUTROPHILS # BLD AUTO: 6.47 X10^3/UL (ref 1.8–6.8)
NEUTROPHILS NFR BLD AUTO: 81 % (ref 42–75)
PLATELET # BLD AUTO: 401 X10^3/UL (ref 130–400)
PMV BLD AUTO: 6.5 FL (ref 7.4–10.4)
PROT SERPL-MCNC: 6.2 G/DL (ref 6.4–8.2)
RBC # BLD AUTO: 4.54 X10^6/UL (ref 3.82–5.3)
TSH SERPL-ACNC: 1.35 MIU/L (ref 0.36–3.74)

## 2017-12-27 PROCEDURE — 83605 ASSAY OF LACTIC ACID: CPT

## 2017-12-27 PROCEDURE — 84100 ASSAY OF PHOSPHORUS: CPT

## 2017-12-27 PROCEDURE — 71020: CPT

## 2017-12-27 PROCEDURE — 87205 SMEAR GRAM STAIN: CPT

## 2017-12-27 PROCEDURE — 87086 URINE CULTURE/COLONY COUNT: CPT

## 2017-12-27 PROCEDURE — 82607 VITAMIN B-12: CPT

## 2017-12-27 PROCEDURE — 84145 PROCALCITONIN (PCT): CPT

## 2017-12-27 PROCEDURE — 87400 INFLUENZA A/B EACH AG IA: CPT

## 2017-12-27 PROCEDURE — 96361 HYDRATE IV INFUSION ADD-ON: CPT

## 2017-12-27 PROCEDURE — 82962 GLUCOSE BLOOD TEST: CPT

## 2017-12-27 PROCEDURE — 36415 COLL VENOUS BLD VENIPUNCTURE: CPT

## 2017-12-27 PROCEDURE — 83735 ASSAY OF MAGNESIUM: CPT

## 2017-12-27 PROCEDURE — 80053 COMPREHEN METABOLIC PANEL: CPT

## 2017-12-27 PROCEDURE — 81001 URINALYSIS AUTO W/SCOPE: CPT

## 2017-12-27 PROCEDURE — 96365 THER/PROPH/DIAG IV INF INIT: CPT

## 2017-12-27 PROCEDURE — 85025 COMPLETE CBC W/AUTO DIFF WBC: CPT

## 2017-12-27 PROCEDURE — 84443 ASSAY THYROID STIM HORMONE: CPT

## 2017-12-27 PROCEDURE — 87324 CLOSTRIDIUM AG IA: CPT

## 2017-12-27 PROCEDURE — 87040 BLOOD CULTURE FOR BACTERIA: CPT

## 2017-12-27 PROCEDURE — 83036 HEMOGLOBIN GLYCOSYLATED A1C: CPT

## 2017-12-27 PROCEDURE — 80048 BASIC METABOLIC PNL TOTAL CA: CPT

## 2017-12-27 PROCEDURE — 93005 ELECTROCARDIOGRAM TRACING: CPT

## 2017-12-27 PROCEDURE — 93306 TTE W/DOPPLER COMPLETE: CPT

## 2017-12-27 RX ADMIN — DOXYCYCLINE SCH MLS/HR: 100 INJECTION, POWDER, LYOPHILIZED, FOR SOLUTION INTRAVENOUS at 10:34

## 2017-12-27 RX ADMIN — GUAIFENESIN SCH MG: 200 TABLET ORAL at 17:29

## 2017-12-27 RX ADMIN — GUAIFENESIN SCH MG: 200 TABLET ORAL at 20:47

## 2017-12-27 RX ADMIN — CARVEDILOL SCH MG: 6.25 TABLET, FILM COATED ORAL at 20:47

## 2017-12-27 RX ADMIN — SODIUM CHLORIDE, PRESERVATIVE FREE SCH ML: 5 INJECTION INTRAVENOUS at 20:47

## 2017-12-27 RX ADMIN — HEPARIN SODIUM SCH UNITS: 5000 INJECTION, SOLUTION INTRAVENOUS; SUBCUTANEOUS at 10:01

## 2017-12-27 RX ADMIN — INSULIN ASPART SCH UNITS: 100 INJECTION, SOLUTION INTRAVENOUS; SUBCUTANEOUS at 12:49

## 2017-12-27 RX ADMIN — INSULIN ASPART SCH UNITS: 100 INJECTION, SOLUTION INTRAVENOUS; SUBCUTANEOUS at 20:48

## 2017-12-27 RX ADMIN — INSULIN ASPART SCH UNITS: 100 INJECTION, SOLUTION INTRAVENOUS; SUBCUTANEOUS at 17:28

## 2017-12-27 RX ADMIN — HYDROCODONE BITARTRATE AND ACETAMINOPHEN PRN TAB: 10; 325 TABLET ORAL at 17:36

## 2017-12-27 RX ADMIN — SODIUM CHLORIDE, PRESERVATIVE FREE SCH ML: 5 INJECTION INTRAVENOUS at 10:02

## 2017-12-27 RX ADMIN — NICOTINE SCH PATCH: 14 PATCH, EXTENDED RELEASE TRANSDERMAL at 10:01

## 2017-12-27 RX ADMIN — GUAIFENESIN SCH MG: 200 TABLET ORAL at 11:56

## 2017-12-27 RX ADMIN — GABAPENTIN SCH MG: 300 CAPSULE ORAL at 20:47

## 2017-12-27 RX ADMIN — DOXYCYCLINE SCH MLS/HR: 100 INJECTION, POWDER, LYOPHILIZED, FOR SOLUTION INTRAVENOUS at 20:47

## 2017-12-27 RX ADMIN — HEPARIN SODIUM SCH UNITS: 5000 INJECTION, SOLUTION INTRAVENOUS; SUBCUTANEOUS at 17:29

## 2017-12-27 RX ADMIN — SODIUM CHLORIDE SCH MLS/HR: 0.9 INJECTION, SOLUTION INTRAVENOUS at 08:24

## 2017-12-28 VITALS — SYSTOLIC BLOOD PRESSURE: 116 MMHG | DIASTOLIC BLOOD PRESSURE: 78 MMHG

## 2017-12-28 VITALS — SYSTOLIC BLOOD PRESSURE: 128 MMHG | DIASTOLIC BLOOD PRESSURE: 72 MMHG

## 2017-12-28 VITALS — SYSTOLIC BLOOD PRESSURE: 117 MMHG | DIASTOLIC BLOOD PRESSURE: 62 MMHG

## 2017-12-28 VITALS — SYSTOLIC BLOOD PRESSURE: 108 MMHG | DIASTOLIC BLOOD PRESSURE: 68 MMHG

## 2017-12-28 LAB
<PLATELET ESTIMATE>: ADEQUATE
<PLT MORPHOLOGY>: (no result)
ANION GAP SERPL CALC-SCNC: 10 MMOL/L (ref 5–15)
ANISOCYTOSIS BLD QL SMEAR: (no result)
BAND#(MANUAL): 0.41 X10^3/UL
CALCIUM SERPL-MCNC: 7.1 MG/DL (ref 8.5–10.1)
CHLORIDE SERPL-SCNC: 109 MMOL/L (ref 98–107)
CLOSTRIDIUM DIFFICILE ANTIGEN: NEGATIVE
CLOSTRIDIUM DIFFICILE TOXIN: NEGATIVE
CREAT SERPL-MCNC: 2.12 MG/DL (ref 0.55–1.02)
ERYTHROCYTE [DISTWIDTH] IN BLOOD BY AUTOMATED COUNT: 16.2 % (ref 9.6–15.2)
LYMPH#(MANUAL): 1.77 X10^3/UL (ref 1–3.4)
LYMPHS% (MANUAL): 13 % (ref 22–44)
MCH RBC QN AUTO: 31.6 PG (ref 27–34.8)
MCHC RBC AUTO-ENTMCNC: 33.4 G/DL (ref 32.4–35.8)
MCV RBC AUTO: 94.5 FL (ref 80–100)
MD: YES
MONOS#(MANUAL): 0.27 X10^3/UL (ref 0.3–2.7)
MONOS% (MANUAL): 2 % (ref 2–9)
NEUTS BAND NFR BLD: 3 % (ref 0–7)
PLATELET # BLD AUTO: 332 X10^3/UL (ref 130–400)
PMV BLD AUTO: 7.2 FL (ref 7.4–10.4)
RBC # BLD AUTO: 4.07 X10^6/UL (ref 3.82–5.3)
SEG#(MANUAL): 11.15 X10^3/UL (ref 1.8–6.8)
SEGS% (MANUAL): 82 % (ref 42–75)

## 2017-12-28 RX ADMIN — HYDROCODONE BITARTRATE AND ACETAMINOPHEN PRN TAB: 10; 325 TABLET ORAL at 11:45

## 2017-12-28 RX ADMIN — POTASSIUM CHLORIDE SCH MEQ: 20 TABLET, EXTENDED RELEASE ORAL at 16:49

## 2017-12-28 RX ADMIN — CARVEDILOL SCH MG: 6.25 TABLET, FILM COATED ORAL at 21:05

## 2017-12-28 RX ADMIN — HEPARIN SODIUM SCH UNITS: 5000 INJECTION, SOLUTION INTRAVENOUS; SUBCUTANEOUS at 08:49

## 2017-12-28 RX ADMIN — INSULIN ASPART SCH UNITS: 100 INJECTION, SOLUTION INTRAVENOUS; SUBCUTANEOUS at 11:41

## 2017-12-28 RX ADMIN — PANTOPRAZOLE SODIUM SCH MG: 40 TABLET, DELAYED RELEASE ORAL at 08:49

## 2017-12-28 RX ADMIN — HEPARIN SODIUM SCH UNITS: 5000 INJECTION, SOLUTION INTRAVENOUS; SUBCUTANEOUS at 16:50

## 2017-12-28 RX ADMIN — HEPARIN SODIUM SCH UNITS: 5000 INJECTION, SOLUTION INTRAVENOUS; SUBCUTANEOUS at 00:12

## 2017-12-28 RX ADMIN — Medication SCH TAB: at 08:50

## 2017-12-28 RX ADMIN — HYDROCODONE BITARTRATE AND ACETAMINOPHEN PRN TAB: 10; 325 TABLET ORAL at 00:16

## 2017-12-28 RX ADMIN — INSULIN ASPART SCH UNITS: 100 INJECTION, SOLUTION INTRAVENOUS; SUBCUTANEOUS at 21:03

## 2017-12-28 RX ADMIN — AMLODIPINE BESYLATE SCH MG: 5 TABLET ORAL at 08:50

## 2017-12-28 RX ADMIN — SODIUM CHLORIDE SCH MLS/HR: 0.9 INJECTION, SOLUTION INTRAVENOUS at 20:30

## 2017-12-28 RX ADMIN — INSULIN ASPART SCH UNITS: 100 INJECTION, SOLUTION INTRAVENOUS; SUBCUTANEOUS at 07:00

## 2017-12-28 RX ADMIN — GABAPENTIN SCH MG: 300 CAPSULE ORAL at 21:04

## 2017-12-28 RX ADMIN — SODIUM CHLORIDE, PRESERVATIVE FREE SCH ML: 5 INJECTION INTRAVENOUS at 21:06

## 2017-12-28 RX ADMIN — CARVEDILOL SCH MG: 6.25 TABLET, FILM COATED ORAL at 08:50

## 2017-12-28 RX ADMIN — DOXYCYCLINE SCH MLS/HR: 100 INJECTION, POWDER, LYOPHILIZED, FOR SOLUTION INTRAVENOUS at 21:04

## 2017-12-28 RX ADMIN — HYDROCODONE BITARTRATE AND ACETAMINOPHEN PRN TAB: 10; 325 TABLET ORAL at 18:16

## 2017-12-28 RX ADMIN — SODIUM CHLORIDE SCH MLS/HR: 0.9 INJECTION, SOLUTION INTRAVENOUS at 11:45

## 2017-12-28 RX ADMIN — GUAIFENESIN SCH MG: 200 TABLET ORAL at 16:49

## 2017-12-28 RX ADMIN — INSULIN ASPART SCH UNITS: 100 INJECTION, SOLUTION INTRAVENOUS; SUBCUTANEOUS at 16:49

## 2017-12-28 RX ADMIN — SODIUM CHLORIDE, PRESERVATIVE FREE SCH ML: 5 INJECTION INTRAVENOUS at 08:51

## 2017-12-28 RX ADMIN — GUAIFENESIN SCH MG: 200 TABLET ORAL at 21:05

## 2017-12-28 RX ADMIN — POTASSIUM CHLORIDE SCH MEQ: 20 TABLET, EXTENDED RELEASE ORAL at 11:40

## 2017-12-28 RX ADMIN — DOXYCYCLINE SCH MLS/HR: 100 INJECTION, POWDER, LYOPHILIZED, FOR SOLUTION INTRAVENOUS at 08:51

## 2017-12-28 RX ADMIN — GUAIFENESIN SCH MG: 200 TABLET ORAL at 11:40

## 2017-12-28 RX ADMIN — SODIUM CHLORIDE SCH MLS/HR: 0.9 INJECTION, SOLUTION INTRAVENOUS at 00:12

## 2017-12-28 RX ADMIN — NICOTINE SCH PATCH: 14 PATCH, EXTENDED RELEASE TRANSDERMAL at 08:50

## 2017-12-28 RX ADMIN — GUAIFENESIN SCH MG: 200 TABLET ORAL at 04:51

## 2017-12-29 VITALS — SYSTOLIC BLOOD PRESSURE: 137 MMHG | DIASTOLIC BLOOD PRESSURE: 76 MMHG

## 2017-12-29 VITALS — DIASTOLIC BLOOD PRESSURE: 66 MMHG | SYSTOLIC BLOOD PRESSURE: 119 MMHG

## 2017-12-29 VITALS — SYSTOLIC BLOOD PRESSURE: 137 MMHG | DIASTOLIC BLOOD PRESSURE: 58 MMHG

## 2017-12-29 VITALS — DIASTOLIC BLOOD PRESSURE: 82 MMHG | SYSTOLIC BLOOD PRESSURE: 149 MMHG

## 2017-12-29 LAB
ANION GAP SERPL CALC-SCNC: 7 MMOL/L (ref 5–15)
BASOPHILS # BLD AUTO: 0.01 X10^3/UL (ref 0–0.1)
BASOPHILS NFR BLD AUTO: 0 % (ref 0–1)
CALCIUM SERPL-MCNC: 6.9 MG/DL (ref 8.5–10.1)
CHLORIDE SERPL-SCNC: 108 MMOL/L (ref 98–107)
CREAT SERPL-MCNC: 1.84 MG/DL (ref 0.55–1.02)
EOSINOPHIL # BLD AUTO: 0 X10^3/UL (ref 0–0.4)
EOSINOPHIL NFR BLD AUTO: 0 % (ref 1–7)
ERYTHROCYTE [DISTWIDTH] IN BLOOD BY AUTOMATED COUNT: 16.3 % (ref 9.6–15.2)
EST. AVERAGE GLUCOSE BLD GHB EST-MCNC: 146 MG/DL (ref 0–126)
HBA1C MFR BLD: 6.7 % (ref 4.2–6.3)
LYMPHOCYTES # BLD AUTO: 0.78 X10^3/UL (ref 1–3.4)
LYMPHOCYTES NFR BLD AUTO: 7 % (ref 22–44)
MCH RBC QN AUTO: 31.5 PG (ref 27–34.8)
MCHC RBC AUTO-ENTMCNC: 33.5 G/DL (ref 32.4–35.8)
MCV RBC AUTO: 94.2 FL (ref 80–100)
MD: NO
MONOCYTES # BLD AUTO: 0.11 X10^3/UL (ref 0.2–0.8)
MONOCYTES NFR BLD AUTO: 1 % (ref 2–9)
NEUTROPHILS # BLD AUTO: 10.18 X10^3/UL (ref 1.8–6.8)
NEUTROPHILS NFR BLD AUTO: 92 % (ref 42–75)
PLATELET # BLD AUTO: 254 X10^3/UL (ref 130–400)
PMV BLD AUTO: 7.1 FL (ref 7.4–10.4)
RBC # BLD AUTO: 3.8 X10^6/UL (ref 3.82–5.3)

## 2017-12-29 RX ADMIN — SODIUM CHLORIDE SCH MLS/HR: 0.9 INJECTION, SOLUTION INTRAVENOUS at 08:54

## 2017-12-29 RX ADMIN — AMLODIPINE BESYLATE SCH MG: 5 TABLET ORAL at 08:53

## 2017-12-29 RX ADMIN — SODIUM CHLORIDE, PRESERVATIVE FREE SCH ML: 5 INJECTION INTRAVENOUS at 21:00

## 2017-12-29 RX ADMIN — GUAIFENESIN SCH MG: 200 TABLET ORAL at 16:52

## 2017-12-29 RX ADMIN — DOXYCYCLINE HYCLATE SCH MG: 100 TABLET, FILM COATED ORAL at 20:58

## 2017-12-29 RX ADMIN — NICOTINE SCH PATCH: 21 PATCH, EXTENDED RELEASE TRANSDERMAL at 11:28

## 2017-12-29 RX ADMIN — HYDROCODONE BITARTRATE AND ACETAMINOPHEN PRN TAB: 10; 325 TABLET ORAL at 20:58

## 2017-12-29 RX ADMIN — CARVEDILOL SCH MG: 6.25 TABLET, FILM COATED ORAL at 20:59

## 2017-12-29 RX ADMIN — PANTOPRAZOLE SODIUM SCH MG: 40 TABLET, DELAYED RELEASE ORAL at 07:30

## 2017-12-29 RX ADMIN — INSULIN ASPART SCH UNITS: 100 INJECTION, SOLUTION INTRAVENOUS; SUBCUTANEOUS at 16:51

## 2017-12-29 RX ADMIN — GABAPENTIN SCH MG: 300 CAPSULE ORAL at 20:59

## 2017-12-29 RX ADMIN — HEPARIN SODIUM SCH UNITS: 5000 INJECTION, SOLUTION INTRAVENOUS; SUBCUTANEOUS at 16:52

## 2017-12-29 RX ADMIN — NICOTINE SCH PATCH: 14 PATCH, EXTENDED RELEASE TRANSDERMAL at 08:30

## 2017-12-29 RX ADMIN — CARVEDILOL SCH MG: 6.25 TABLET, FILM COATED ORAL at 08:53

## 2017-12-29 RX ADMIN — INSULIN DETEMIR SCH UNITS: 100 INJECTION, SOLUTION SUBCUTANEOUS at 11:34

## 2017-12-29 RX ADMIN — INSULIN ASPART SCH UNITS: 100 INJECTION, SOLUTION INTRAVENOUS; SUBCUTANEOUS at 07:00

## 2017-12-29 RX ADMIN — INSULIN ASPART SCH UNITS: 100 INJECTION, SOLUTION INTRAVENOUS; SUBCUTANEOUS at 21:02

## 2017-12-29 RX ADMIN — INSULIN ASPART SCH UNITS: 100 INJECTION, SOLUTION INTRAVENOUS; SUBCUTANEOUS at 11:33

## 2017-12-29 RX ADMIN — HEPARIN SODIUM SCH UNITS: 5000 INJECTION, SOLUTION INTRAVENOUS; SUBCUTANEOUS at 08:30

## 2017-12-29 RX ADMIN — ASPIRIN SCH MG: 81 TABLET, COATED ORAL at 11:27

## 2017-12-29 RX ADMIN — HEPARIN SODIUM SCH UNITS: 5000 INJECTION, SOLUTION INTRAVENOUS; SUBCUTANEOUS at 00:25

## 2017-12-29 RX ADMIN — Medication SCH TAB: at 08:53

## 2017-12-29 RX ADMIN — GUAIFENESIN SCH MG: 200 TABLET ORAL at 20:59

## 2017-12-29 RX ADMIN — GUAIFENESIN SCH MG: 200 TABLET ORAL at 05:39

## 2017-12-29 RX ADMIN — HYDROCODONE BITARTRATE AND ACETAMINOPHEN PRN TAB: 10; 325 TABLET ORAL at 00:25

## 2017-12-29 RX ADMIN — INSULIN DETEMIR SCH UNITS: 100 INJECTION, SOLUTION SUBCUTANEOUS at 21:01

## 2017-12-29 RX ADMIN — GUAIFENESIN SCH MG: 200 TABLET ORAL at 11:34

## 2017-12-29 RX ADMIN — CEFDINIR SCH MG: 300 CAPSULE ORAL at 20:59

## 2017-12-29 RX ADMIN — SODIUM CHLORIDE, PRESERVATIVE FREE SCH ML: 5 INJECTION INTRAVENOUS at 08:53

## 2017-12-30 VITALS — SYSTOLIC BLOOD PRESSURE: 144 MMHG | DIASTOLIC BLOOD PRESSURE: 66 MMHG

## 2017-12-30 VITALS — DIASTOLIC BLOOD PRESSURE: 72 MMHG | SYSTOLIC BLOOD PRESSURE: 150 MMHG

## 2017-12-30 LAB
ANION GAP SERPL CALC-SCNC: 9 MMOL/L (ref 5–15)
BASOPHILS # BLD AUTO: 0.02 X10^3/UL (ref 0–0.1)
BASOPHILS NFR BLD AUTO: 0 % (ref 0–1)
CALCIUM SERPL-MCNC: 6.9 MG/DL (ref 8.5–10.1)
CHLORIDE SERPL-SCNC: 108 MMOL/L (ref 98–107)
CREAT SERPL-MCNC: 1.59 MG/DL (ref 0.55–1.02)
EOSINOPHIL # BLD AUTO: 0 X10^3/UL (ref 0–0.4)
EOSINOPHIL NFR BLD AUTO: 0 % (ref 1–7)
ERYTHROCYTE [DISTWIDTH] IN BLOOD BY AUTOMATED COUNT: 16.5 % (ref 9.6–15.2)
LYMPHOCYTES # BLD AUTO: 0.8 X10^3/UL (ref 1–3.4)
LYMPHOCYTES NFR BLD AUTO: 7 % (ref 22–44)
MCH RBC QN AUTO: 32 PG (ref 27–34.8)
MCHC RBC AUTO-ENTMCNC: 33.6 G/DL (ref 32.4–35.8)
MCV RBC AUTO: 95.1 FL (ref 80–100)
MD: NO
MONOCYTES # BLD AUTO: 0.21 X10^3/UL (ref 0.2–0.8)
MONOCYTES NFR BLD AUTO: 2 % (ref 2–9)
NEUTROPHILS # BLD AUTO: 9.97 X10^3/UL (ref 1.8–6.8)
NEUTROPHILS NFR BLD AUTO: 91 % (ref 42–75)
PLATELET # BLD AUTO: 246 X10^3/UL (ref 130–400)
PMV BLD AUTO: 7.1 FL (ref 7.4–10.4)
RBC # BLD AUTO: 3.62 X10^6/UL (ref 3.82–5.3)

## 2017-12-30 RX ADMIN — SODIUM CHLORIDE SCH MLS/HR: 0.9 INJECTION, SOLUTION INTRAVENOUS at 08:37

## 2017-12-30 RX ADMIN — HEPARIN SODIUM SCH UNITS: 5000 INJECTION, SOLUTION INTRAVENOUS; SUBCUTANEOUS at 01:14

## 2017-12-30 RX ADMIN — NICOTINE SCH PATCH: 21 PATCH, EXTENDED RELEASE TRANSDERMAL at 08:34

## 2017-12-30 RX ADMIN — GUAIFENESIN SCH MG: 200 TABLET ORAL at 12:14

## 2017-12-30 RX ADMIN — CEFDINIR SCH MG: 300 CAPSULE ORAL at 08:34

## 2017-12-30 RX ADMIN — INSULIN ASPART SCH UNITS: 100 INJECTION, SOLUTION INTRAVENOUS; SUBCUTANEOUS at 12:14

## 2017-12-30 RX ADMIN — INSULIN DETEMIR SCH UNITS: 100 INJECTION, SOLUTION SUBCUTANEOUS at 12:14

## 2017-12-30 RX ADMIN — AMLODIPINE BESYLATE SCH MG: 5 TABLET ORAL at 08:32

## 2017-12-30 RX ADMIN — GUAIFENESIN SCH MG: 200 TABLET ORAL at 04:55

## 2017-12-30 RX ADMIN — CARVEDILOL SCH MG: 6.25 TABLET, FILM COATED ORAL at 08:33

## 2017-12-30 RX ADMIN — SODIUM CHLORIDE SCH MLS/HR: 0.9 INJECTION, SOLUTION INTRAVENOUS at 00:30

## 2017-12-30 RX ADMIN — INSULIN ASPART SCH UNITS: 100 INJECTION, SOLUTION INTRAVENOUS; SUBCUTANEOUS at 08:30

## 2017-12-30 RX ADMIN — SODIUM CHLORIDE, PRESERVATIVE FREE SCH ML: 5 INJECTION INTRAVENOUS at 08:37

## 2017-12-30 RX ADMIN — ASPIRIN SCH MG: 81 TABLET, COATED ORAL at 04:55

## 2017-12-30 RX ADMIN — HYDROCODONE BITARTRATE AND ACETAMINOPHEN PRN TAB: 10; 325 TABLET ORAL at 01:23

## 2017-12-30 RX ADMIN — DOXYCYCLINE HYCLATE SCH MG: 100 TABLET, FILM COATED ORAL at 08:32

## 2017-12-30 RX ADMIN — Medication SCH TAB: at 08:33

## 2017-12-30 RX ADMIN — HEPARIN SODIUM SCH UNITS: 5000 INJECTION, SOLUTION INTRAVENOUS; SUBCUTANEOUS at 08:34

## 2017-12-30 RX ADMIN — PANTOPRAZOLE SODIUM SCH MG: 40 TABLET, DELAYED RELEASE ORAL at 08:32

## 2018-04-20 ENCOUNTER — HOSPITAL ENCOUNTER (OUTPATIENT)
Dept: HOSPITAL 8 - CARD | Age: 63
Discharge: HOME | End: 2018-04-20
Attending: PSYCHIATRY & NEUROLOGY
Payer: COMMERCIAL

## 2018-04-20 DIAGNOSIS — M16.12: Primary | ICD-10-CM

## 2018-04-20 DIAGNOSIS — R55: ICD-10-CM

## 2018-04-20 DIAGNOSIS — R41.3: ICD-10-CM

## 2018-04-20 PROCEDURE — 95819 EEG AWAKE AND ASLEEP: CPT

## 2018-05-03 ENCOUNTER — HOSPITAL ENCOUNTER (OUTPATIENT)
Dept: HOSPITAL 8 - RAD | Age: 63
End: 2018-05-03
Attending: PSYCHIATRY & NEUROLOGY
Payer: COMMERCIAL

## 2018-05-03 DIAGNOSIS — R90.82: Primary | ICD-10-CM

## 2018-05-03 PROCEDURE — 70551 MRI BRAIN STEM W/O DYE: CPT

## 2018-06-21 ENCOUNTER — HOSPITAL ENCOUNTER (OUTPATIENT)
Dept: HOSPITAL 8 - WOUND | Age: 63
Discharge: HOME | End: 2018-06-21
Attending: PODIATRIST
Payer: COMMERCIAL

## 2018-06-21 DIAGNOSIS — Z90.710: ICD-10-CM

## 2018-06-21 DIAGNOSIS — L97.512: ICD-10-CM

## 2018-06-21 DIAGNOSIS — E11.21: ICD-10-CM

## 2018-06-21 DIAGNOSIS — Z85.41: ICD-10-CM

## 2018-06-21 DIAGNOSIS — Z79.4: ICD-10-CM

## 2018-06-21 DIAGNOSIS — Z90.49: ICD-10-CM

## 2018-06-21 DIAGNOSIS — K21.9: ICD-10-CM

## 2018-06-21 DIAGNOSIS — N18.3: ICD-10-CM

## 2018-06-21 DIAGNOSIS — M16.12: ICD-10-CM

## 2018-06-21 DIAGNOSIS — E11.40: ICD-10-CM

## 2018-06-21 DIAGNOSIS — F17.210: ICD-10-CM

## 2018-06-21 DIAGNOSIS — E11.621: Primary | ICD-10-CM

## 2018-06-21 DIAGNOSIS — E11.22: ICD-10-CM

## 2018-06-21 DIAGNOSIS — F41.9: ICD-10-CM

## 2018-06-21 DIAGNOSIS — I12.9: ICD-10-CM

## 2018-06-21 DIAGNOSIS — E78.5: ICD-10-CM

## 2018-06-21 DIAGNOSIS — E11.65: ICD-10-CM

## 2018-06-21 PROCEDURE — 97597 DBRDMT OPN WND 1ST 20 CM/<: CPT

## 2018-06-29 ENCOUNTER — HOSPITAL ENCOUNTER (OUTPATIENT)
Dept: HOSPITAL 8 - WOUND | Age: 63
Discharge: HOME | End: 2018-06-29
Attending: FAMILY MEDICINE
Payer: COMMERCIAL

## 2018-06-29 DIAGNOSIS — F17.210: ICD-10-CM

## 2018-06-29 DIAGNOSIS — Z79.4: ICD-10-CM

## 2018-06-29 DIAGNOSIS — E11.22: ICD-10-CM

## 2018-06-29 DIAGNOSIS — E11.40: ICD-10-CM

## 2018-06-29 DIAGNOSIS — Z90.710: ICD-10-CM

## 2018-06-29 DIAGNOSIS — K21.9: ICD-10-CM

## 2018-06-29 DIAGNOSIS — E11.21: ICD-10-CM

## 2018-06-29 DIAGNOSIS — Z90.49: ICD-10-CM

## 2018-06-29 DIAGNOSIS — N18.3: ICD-10-CM

## 2018-06-29 DIAGNOSIS — I12.9: ICD-10-CM

## 2018-06-29 DIAGNOSIS — L97.512: ICD-10-CM

## 2018-06-29 DIAGNOSIS — F41.9: ICD-10-CM

## 2018-06-29 DIAGNOSIS — E78.5: ICD-10-CM

## 2018-06-29 DIAGNOSIS — Z85.41: ICD-10-CM

## 2018-06-29 DIAGNOSIS — M16.12: ICD-10-CM

## 2018-06-29 DIAGNOSIS — E11.621: Primary | ICD-10-CM

## 2018-06-29 PROCEDURE — 99214 OFFICE O/P EST MOD 30 MIN: CPT

## 2018-08-03 ENCOUNTER — HOSPITAL ENCOUNTER (OUTPATIENT)
Dept: HOSPITAL 8 - WOUND | Age: 63
Discharge: HOME | End: 2018-08-03
Attending: FAMILY MEDICINE
Payer: COMMERCIAL

## 2018-08-03 DIAGNOSIS — K21.9: ICD-10-CM

## 2018-08-03 DIAGNOSIS — N18.3: ICD-10-CM

## 2018-08-03 DIAGNOSIS — M16.12: ICD-10-CM

## 2018-08-03 DIAGNOSIS — Z90.710: ICD-10-CM

## 2018-08-03 DIAGNOSIS — E11.621: Primary | ICD-10-CM

## 2018-08-03 DIAGNOSIS — Z85.41: ICD-10-CM

## 2018-08-03 DIAGNOSIS — L97.521: ICD-10-CM

## 2018-08-03 DIAGNOSIS — Z79.4: ICD-10-CM

## 2018-08-03 DIAGNOSIS — L97.512: ICD-10-CM

## 2018-08-03 DIAGNOSIS — F17.210: ICD-10-CM

## 2018-08-03 DIAGNOSIS — E11.22: ICD-10-CM

## 2018-08-03 DIAGNOSIS — I12.9: ICD-10-CM

## 2018-08-03 DIAGNOSIS — E11.21: ICD-10-CM

## 2018-08-03 DIAGNOSIS — E78.5: ICD-10-CM

## 2018-08-03 DIAGNOSIS — E11.40: ICD-10-CM

## 2018-08-03 DIAGNOSIS — F41.9: ICD-10-CM

## 2018-08-03 DIAGNOSIS — Z90.49: ICD-10-CM

## 2018-08-03 PROCEDURE — 99214 OFFICE O/P EST MOD 30 MIN: CPT

## 2018-08-17 ENCOUNTER — HOSPITAL ENCOUNTER (OUTPATIENT)
Dept: HOSPITAL 8 - WOUND | Age: 63
Discharge: HOME | End: 2018-08-17
Attending: FAMILY MEDICINE
Payer: COMMERCIAL

## 2018-08-17 DIAGNOSIS — I12.9: ICD-10-CM

## 2018-08-17 DIAGNOSIS — E11.21: ICD-10-CM

## 2018-08-17 DIAGNOSIS — Z79.4: ICD-10-CM

## 2018-08-17 DIAGNOSIS — E11.22: ICD-10-CM

## 2018-08-17 DIAGNOSIS — F41.9: ICD-10-CM

## 2018-08-17 DIAGNOSIS — Z90.710: ICD-10-CM

## 2018-08-17 DIAGNOSIS — E11.40: ICD-10-CM

## 2018-08-17 DIAGNOSIS — E11.65: ICD-10-CM

## 2018-08-17 DIAGNOSIS — L97.512: ICD-10-CM

## 2018-08-17 DIAGNOSIS — E78.4: ICD-10-CM

## 2018-08-17 DIAGNOSIS — Z90.49: ICD-10-CM

## 2018-08-17 DIAGNOSIS — E11.621: Primary | ICD-10-CM

## 2018-08-17 DIAGNOSIS — K21.9: ICD-10-CM

## 2018-08-17 DIAGNOSIS — N18.3: ICD-10-CM

## 2018-08-17 DIAGNOSIS — F17.210: ICD-10-CM

## 2018-08-17 DIAGNOSIS — M16.12: ICD-10-CM

## 2018-08-17 DIAGNOSIS — Z85.41: ICD-10-CM

## 2018-08-17 PROCEDURE — 11042 DBRDMT SUBQ TIS 1ST 20SQCM/<: CPT

## 2019-04-09 ENCOUNTER — HOSPITAL ENCOUNTER (OUTPATIENT)
Dept: HOSPITAL 8 - WOUND | Age: 64
Discharge: HOME | End: 2019-04-09
Attending: NURSE PRACTITIONER
Payer: MEDICARE

## 2019-04-09 DIAGNOSIS — E11.22: ICD-10-CM

## 2019-04-09 DIAGNOSIS — E11.65: ICD-10-CM

## 2019-04-09 DIAGNOSIS — E11.622: Primary | ICD-10-CM

## 2019-04-09 DIAGNOSIS — L97.812: ICD-10-CM

## 2019-04-09 DIAGNOSIS — L97.822: ICD-10-CM

## 2019-04-09 DIAGNOSIS — E78.5: ICD-10-CM

## 2019-04-09 DIAGNOSIS — M16.12: ICD-10-CM

## 2019-04-09 DIAGNOSIS — E11.40: ICD-10-CM

## 2019-04-09 DIAGNOSIS — N18.3: ICD-10-CM

## 2019-04-09 DIAGNOSIS — E11.21: ICD-10-CM

## 2019-04-09 DIAGNOSIS — F17.210: ICD-10-CM

## 2019-04-09 DIAGNOSIS — F41.9: ICD-10-CM

## 2019-04-09 DIAGNOSIS — K21.9: ICD-10-CM

## 2019-04-09 PROCEDURE — 11042 DBRDMT SUBQ TIS 1ST 20SQCM/<: CPT

## 2019-04-19 ENCOUNTER — HOSPITAL ENCOUNTER (OUTPATIENT)
Dept: HOSPITAL 8 - CVU | Age: 64
Discharge: HOME | End: 2019-04-19
Attending: NURSE PRACTITIONER
Payer: MEDICARE

## 2019-04-19 ENCOUNTER — HOSPITAL ENCOUNTER (OUTPATIENT)
Dept: HOSPITAL 8 - WOUND | Age: 64
Discharge: HOME | End: 2019-04-19
Attending: FAMILY MEDICINE
Payer: MEDICARE

## 2019-04-19 DIAGNOSIS — E11.21: ICD-10-CM

## 2019-04-19 DIAGNOSIS — M16.12: ICD-10-CM

## 2019-04-19 DIAGNOSIS — E11.22: ICD-10-CM

## 2019-04-19 DIAGNOSIS — L97.822: ICD-10-CM

## 2019-04-19 DIAGNOSIS — L97.812: ICD-10-CM

## 2019-04-19 DIAGNOSIS — F17.210: ICD-10-CM

## 2019-04-19 DIAGNOSIS — E11.65: ICD-10-CM

## 2019-04-19 DIAGNOSIS — F41.9: ICD-10-CM

## 2019-04-19 DIAGNOSIS — E11.40: ICD-10-CM

## 2019-04-19 DIAGNOSIS — E11.622: Primary | ICD-10-CM

## 2019-04-19 DIAGNOSIS — K21.9: ICD-10-CM

## 2019-04-19 DIAGNOSIS — E78.5: ICD-10-CM

## 2019-04-19 DIAGNOSIS — N18.3: ICD-10-CM

## 2019-04-19 DIAGNOSIS — N18.4: ICD-10-CM

## 2019-04-19 PROCEDURE — 97597 DBRDMT OPN WND 1ST 20 CM/<: CPT

## 2019-04-19 PROCEDURE — 93970 EXTREMITY STUDY: CPT

## 2019-04-26 ENCOUNTER — HOSPITAL ENCOUNTER (OUTPATIENT)
Dept: HOSPITAL 8 - WOUND | Age: 64
Discharge: HOME | End: 2019-04-26
Attending: FAMILY MEDICINE
Payer: MEDICARE

## 2019-04-26 DIAGNOSIS — E11.40: ICD-10-CM

## 2019-04-26 DIAGNOSIS — E11.22: ICD-10-CM

## 2019-04-26 DIAGNOSIS — L97.829: ICD-10-CM

## 2019-04-26 DIAGNOSIS — E11.622: Primary | ICD-10-CM

## 2019-04-26 DIAGNOSIS — N18.3: ICD-10-CM

## 2019-04-26 DIAGNOSIS — I25.2: ICD-10-CM

## 2019-04-26 DIAGNOSIS — Z90.710: ICD-10-CM

## 2019-04-26 DIAGNOSIS — F17.210: ICD-10-CM

## 2019-04-26 DIAGNOSIS — L97.812: ICD-10-CM

## 2019-04-26 DIAGNOSIS — K21.9: ICD-10-CM

## 2019-04-26 DIAGNOSIS — I12.9: ICD-10-CM

## 2019-04-26 DIAGNOSIS — E78.5: ICD-10-CM

## 2019-06-19 ENCOUNTER — HOSPITAL ENCOUNTER (EMERGENCY)
Dept: HOSPITAL 8 - ED | Age: 64
End: 2019-06-19
Payer: MEDICARE

## 2019-06-19 DIAGNOSIS — Z53.21: ICD-10-CM

## 2019-06-19 DIAGNOSIS — I50.9: Primary | ICD-10-CM

## 2019-11-12 ENCOUNTER — HOSPITAL ENCOUNTER (EMERGENCY)
Dept: HOSPITAL 8 - ED | Age: 64
Discharge: HOME | End: 2019-11-12
Payer: MEDICARE

## 2019-11-12 VITALS — DIASTOLIC BLOOD PRESSURE: 59 MMHG | SYSTOLIC BLOOD PRESSURE: 98 MMHG

## 2019-11-12 VITALS — HEIGHT: 68 IN | BODY MASS INDEX: 26.73 KG/M2 | WEIGHT: 176.37 LBS

## 2019-11-12 DIAGNOSIS — E11.22: ICD-10-CM

## 2019-11-12 DIAGNOSIS — E11.9: ICD-10-CM

## 2019-11-12 DIAGNOSIS — Z90.49: ICD-10-CM

## 2019-11-12 DIAGNOSIS — E11.40: ICD-10-CM

## 2019-11-12 DIAGNOSIS — Z90.89: ICD-10-CM

## 2019-11-12 DIAGNOSIS — Z90.710: ICD-10-CM

## 2019-11-12 DIAGNOSIS — I50.9: ICD-10-CM

## 2019-11-12 DIAGNOSIS — M79.2: ICD-10-CM

## 2019-11-12 DIAGNOSIS — N30.00: Primary | ICD-10-CM

## 2019-11-12 DIAGNOSIS — N18.6: ICD-10-CM

## 2019-11-12 DIAGNOSIS — I13.0: ICD-10-CM

## 2019-11-12 LAB
ALBUMIN SERPL-MCNC: 3.7 G/DL (ref 3.4–5)
ALP SERPL-CCNC: 98 U/L (ref 45–117)
ALT SERPL-CCNC: 21 U/L (ref 12–78)
ANION GAP SERPL CALC-SCNC: 6 MMOL/L (ref 5–15)
BASOPHILS # BLD AUTO: 0.04 X10^3/UL (ref 0–0.1)
BASOPHILS NFR BLD AUTO: 0 % (ref 0–1)
BILIRUB SERPL-MCNC: 0.7 MG/DL (ref 0.2–1)
CALCIUM SERPL-MCNC: 8.8 MG/DL (ref 8.5–10.1)
CHLORIDE SERPL-SCNC: 102 MMOL/L (ref 98–107)
CREAT SERPL-MCNC: 3.23 MG/DL (ref 0.55–1.02)
CULTURE INDICATED?: YES
EOSINOPHIL # BLD AUTO: 0.4 X10^3/UL (ref 0–0.4)
EOSINOPHIL NFR BLD AUTO: 4 % (ref 1–7)
ERYTHROCYTE [DISTWIDTH] IN BLOOD BY AUTOMATED COUNT: 13.2 % (ref 9.6–15.2)
LYMPHOCYTES # BLD AUTO: 2.23 X10^3/UL (ref 1–3.4)
LYMPHOCYTES NFR BLD AUTO: 23 % (ref 22–44)
MCH RBC QN AUTO: 31.1 PG (ref 27–34.8)
MCHC RBC AUTO-ENTMCNC: 32.7 G/DL (ref 32.4–35.8)
MCV RBC AUTO: 95.2 FL (ref 80–100)
MD: NO
MICROSCOPIC: (no result)
MONOCYTES # BLD AUTO: 0.63 X10^3/UL (ref 0.2–0.8)
MONOCYTES NFR BLD AUTO: 7 % (ref 2–9)
NEUTROPHILS # BLD AUTO: 6.36 X10^3/UL (ref 1.8–6.8)
NEUTROPHILS NFR BLD AUTO: 66 % (ref 42–75)
PLATELET # BLD AUTO: 213 X10^3/UL (ref 130–400)
PMV BLD AUTO: 7.5 FL (ref 7.4–10.4)
PROT SERPL-MCNC: 7.1 G/DL (ref 6.4–8.2)
RBC # BLD AUTO: 4.06 X10^6/UL (ref 3.82–5.3)

## 2019-11-12 PROCEDURE — 83735 ASSAY OF MAGNESIUM: CPT

## 2019-11-12 PROCEDURE — 87086 URINE CULTURE/COLONY COUNT: CPT

## 2019-11-12 PROCEDURE — 80053 COMPREHEN METABOLIC PANEL: CPT

## 2019-11-12 PROCEDURE — 81001 URINALYSIS AUTO W/SCOPE: CPT

## 2019-11-12 PROCEDURE — 87186 SC STD MICRODIL/AGAR DIL: CPT

## 2019-11-12 PROCEDURE — 93005 ELECTROCARDIOGRAM TRACING: CPT

## 2019-11-12 PROCEDURE — 84100 ASSAY OF PHOSPHORUS: CPT

## 2019-11-12 PROCEDURE — 99284 EMERGENCY DEPT VISIT MOD MDM: CPT

## 2019-11-12 PROCEDURE — 85025 COMPLETE CBC W/AUTO DIFF WBC: CPT

## 2019-11-12 PROCEDURE — 87077 CULTURE AEROBIC IDENTIFY: CPT

## 2019-11-12 PROCEDURE — 36415 COLL VENOUS BLD VENIPUNCTURE: CPT

## 2019-11-12 RX ADMIN — GABAPENTIN ONE MG: 300 CAPSULE ORAL at 21:30

## 2019-11-12 RX ADMIN — MAGNESIUM OXIDE ONE MG: 400 TABLET ORAL at 20:00

## 2019-11-12 NOTE — NUR
PATIENT PRESENTS TO ED TODAY FOR GENERALIZED BODY PAIN, REPORTS "BODY FEELS ON 
FIRE".  HX END STAGE KIDNEY DISEASE. AWAITING MD ORDERS, CALL LIGHT WITHIN 
REACH. LIAM BUNDY AT BEDSIDE FOR IV START.

## 2019-11-12 NOTE — NUR
PT MEDICATED PER EMAR. 



Patient/Caregiver given discharge instructions and they have confirmed that 
they understand the instructions.  Patient ambulatory with steady gait.

## 2019-11-12 NOTE — NUR
PT GIVEN CATH SUPPLIES. PT REPORTS ABLE TO DO STERILE AS SHE HAS BEEN DOING 
THEM FOR 10 YEARS. VSS.

## 2020-01-02 ENCOUNTER — HOSPITAL ENCOUNTER (OUTPATIENT)
Dept: HOSPITAL 8 - CFH | Age: 65
Discharge: HOME | End: 2020-01-02
Attending: PHYSICIAN ASSISTANT
Payer: MEDICARE

## 2020-01-02 DIAGNOSIS — I25.9: Primary | ICD-10-CM

## 2020-01-02 PROCEDURE — A9502 TC99M TETROFOSMIN: HCPCS

## 2020-01-02 PROCEDURE — 78452 HT MUSCLE IMAGE SPECT MULT: CPT

## 2020-01-02 PROCEDURE — 93017 CV STRESS TEST TRACING ONLY: CPT

## 2020-01-30 ENCOUNTER — HOSPITAL ENCOUNTER (EMERGENCY)
Dept: HOSPITAL 8 - ED | Age: 65
Discharge: HOME | End: 2020-01-30
Payer: MEDICARE

## 2020-01-30 VITALS — BODY MASS INDEX: 28.77 KG/M2 | WEIGHT: 189.82 LBS | HEIGHT: 68 IN

## 2020-01-30 VITALS — SYSTOLIC BLOOD PRESSURE: 155 MMHG | DIASTOLIC BLOOD PRESSURE: 65 MMHG

## 2020-01-30 DIAGNOSIS — M89.8X6: Primary | ICD-10-CM

## 2020-01-30 DIAGNOSIS — I25.10: ICD-10-CM

## 2020-01-30 DIAGNOSIS — E11.22: ICD-10-CM

## 2020-01-30 DIAGNOSIS — Z87.891: ICD-10-CM

## 2020-01-30 DIAGNOSIS — N18.4: ICD-10-CM

## 2020-01-30 DIAGNOSIS — Z95.5: ICD-10-CM

## 2020-01-30 LAB
ALBUMIN SERPL-MCNC: 3.5 G/DL (ref 3.4–5)
ALP SERPL-CCNC: 92 U/L (ref 45–117)
ALT SERPL-CCNC: 17 U/L (ref 12–78)
ANION GAP SERPL CALC-SCNC: 8 MMOL/L (ref 5–15)
BASOPHILS # BLD AUTO: 0.09 X10^3/UL (ref 0–0.1)
BASOPHILS NFR BLD AUTO: 1 % (ref 0–1)
BILIRUB SERPL-MCNC: 0.6 MG/DL (ref 0.2–1)
CALCIUM SERPL-MCNC: 8.9 MG/DL (ref 8.5–10.1)
CHLORIDE SERPL-SCNC: 104 MMOL/L (ref 98–107)
CREAT SERPL-MCNC: 3.24 MG/DL (ref 0.55–1.02)
EOSINOPHIL # BLD AUTO: 0.62 X10^3/UL (ref 0–0.4)
EOSINOPHIL NFR BLD AUTO: 6 % (ref 1–7)
ERYTHROCYTE [DISTWIDTH] IN BLOOD BY AUTOMATED COUNT: 16.4 % (ref 9.6–15.2)
LYMPHOCYTES # BLD AUTO: 2.09 X10^3/UL (ref 1–3.4)
LYMPHOCYTES NFR BLD AUTO: 19 % (ref 22–44)
MCH RBC QN AUTO: 30.9 PG (ref 27–34.8)
MCHC RBC AUTO-ENTMCNC: 33.1 G/DL (ref 32.4–35.8)
MCV RBC AUTO: 93.4 FL (ref 80–100)
MD: NO
MONOCYTES # BLD AUTO: 0.77 X10^3/UL (ref 0.2–0.8)
MONOCYTES NFR BLD AUTO: 7 % (ref 2–9)
NEUTROPHILS # BLD AUTO: 7.77 X10^3/UL (ref 1.8–6.8)
NEUTROPHILS NFR BLD AUTO: 69 % (ref 42–75)
PLATELET # BLD AUTO: 254 X10^3/UL (ref 130–400)
PMV BLD AUTO: 7.8 FL (ref 7.4–10.4)
PROT SERPL-MCNC: 7 G/DL (ref 6.4–8.2)
RBC # BLD AUTO: 3.35 X10^6/UL (ref 3.82–5.3)

## 2020-01-30 PROCEDURE — 82962 GLUCOSE BLOOD TEST: CPT

## 2020-01-30 PROCEDURE — 83735 ASSAY OF MAGNESIUM: CPT

## 2020-01-30 PROCEDURE — 93005 ELECTROCARDIOGRAM TRACING: CPT

## 2020-01-30 PROCEDURE — 99284 EMERGENCY DEPT VISIT MOD MDM: CPT

## 2020-01-30 PROCEDURE — 85025 COMPLETE CBC W/AUTO DIFF WBC: CPT

## 2020-01-30 PROCEDURE — 84100 ASSAY OF PHOSPHORUS: CPT

## 2020-01-30 PROCEDURE — 80053 COMPREHEN METABOLIC PANEL: CPT

## 2020-01-30 NOTE — NUR
Pt declines wearing VASHTI hose at this time, per pt she has compression stockings 
at home. IV dc'd with tip intact d/c information and rx given to pt. Pt 
verbalized understanding.

## 2020-09-06 ENCOUNTER — HOSPITAL ENCOUNTER (EMERGENCY)
Dept: HOSPITAL 8 - ED | Age: 65
Discharge: HOME | End: 2020-09-06
Payer: MEDICARE

## 2020-09-06 VITALS — BODY MASS INDEX: 28.4 KG/M2 | WEIGHT: 187.39 LBS | HEIGHT: 68 IN

## 2020-09-06 VITALS — DIASTOLIC BLOOD PRESSURE: 66 MMHG | SYSTOLIC BLOOD PRESSURE: 153 MMHG

## 2020-09-06 DIAGNOSIS — I25.2: ICD-10-CM

## 2020-09-06 DIAGNOSIS — I11.0: ICD-10-CM

## 2020-09-06 DIAGNOSIS — I50.9: ICD-10-CM

## 2020-09-06 DIAGNOSIS — R94.31: ICD-10-CM

## 2020-09-06 DIAGNOSIS — E11.42: Primary | ICD-10-CM

## 2020-09-06 LAB
ALBUMIN SERPL-MCNC: 3.1 G/DL (ref 3.4–5)
ANION GAP SERPL CALC-SCNC: 10 MMOL/L (ref 5–15)
BASOPHILS # BLD AUTO: 0.01 X10^3/UL (ref 0–0.1)
BASOPHILS NFR BLD AUTO: 0 % (ref 0–1)
CALCIUM SERPL-MCNC: 8.4 MG/DL (ref 8.5–10.1)
CHLORIDE SERPL-SCNC: 102 MMOL/L (ref 98–107)
CREAT SERPL-MCNC: 4.8 MG/DL (ref 0.55–1.02)
EOSINOPHIL # BLD AUTO: 0.34 X10^3/UL (ref 0–0.4)
EOSINOPHIL NFR BLD AUTO: 3 % (ref 1–7)
ERYTHROCYTE [DISTWIDTH] IN BLOOD BY AUTOMATED COUNT: 15 % (ref 9.6–15.2)
LYMPHOCYTES # BLD AUTO: 1.22 X10^3/UL (ref 1–3.4)
LYMPHOCYTES NFR BLD AUTO: 10 % (ref 22–44)
MCH RBC QN AUTO: 30.7 PG (ref 27–34.8)
MCHC RBC AUTO-ENTMCNC: 32.1 G/DL (ref 32.4–35.8)
MCV RBC AUTO: 95.5 FL (ref 80–100)
MD: NO
MONOCYTES # BLD AUTO: 0.52 X10^3/UL (ref 0.2–0.8)
MONOCYTES NFR BLD AUTO: 4 % (ref 2–9)
NEUTROPHILS # BLD AUTO: 9.78 X10^3/UL (ref 1.8–6.8)
NEUTROPHILS NFR BLD AUTO: 82 % (ref 42–75)
PLATELET # BLD AUTO: 169 X10^3/UL (ref 130–400)
PMV BLD AUTO: 7.8 FL (ref 7.4–10.4)
RBC # BLD AUTO: 3.62 X10^6/UL (ref 3.82–5.3)

## 2020-09-06 PROCEDURE — 36415 COLL VENOUS BLD VENIPUNCTURE: CPT

## 2020-09-06 PROCEDURE — 84100 ASSAY OF PHOSPHORUS: CPT

## 2020-09-06 PROCEDURE — 93005 ELECTROCARDIOGRAM TRACING: CPT

## 2020-09-06 PROCEDURE — 83735 ASSAY OF MAGNESIUM: CPT

## 2020-09-06 PROCEDURE — 85025 COMPLETE CBC W/AUTO DIFF WBC: CPT

## 2020-09-06 PROCEDURE — 99285 EMERGENCY DEPT VISIT HI MDM: CPT

## 2020-09-06 PROCEDURE — 80048 BASIC METABOLIC PNL TOTAL CA: CPT

## 2020-09-06 PROCEDURE — 82040 ASSAY OF SERUM ALBUMIN: CPT

## 2020-09-06 NOTE — NUR
PT  PLACED ON ALL ROOM MONITORING.  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  PT STATES 
SHE MISSED SESSION OF DIALYSIS D/T RLE PAIN WHICH WORSENS AFTER DIALYSIS.  LAST 
DIALYSIS WED.  PERCOCET GIVEN PER ERP ORDER FOR 8/10 RLE PAIN.  VSS, CALL LIGHT 
WITHIN REACH.  WARM BLANKET OFFERED BUT REFUSED.  LAB IN TO DRAW.

## 2020-09-06 NOTE — NUR
PT PLACED ON 2LITERS OXYGEN VIA NC FOR PULSE OX DIPPING TO 86% ON RA.  AWAITING 
LAB AND US RESULTS.  AT BS, CALL LIGHT WITHIN REACH.

## 2020-09-17 ENCOUNTER — HOSPITAL ENCOUNTER (OUTPATIENT)
Dept: HOSPITAL 8 - CFH | Age: 65
Discharge: HOME | End: 2020-09-17
Attending: SURGERY
Payer: MEDICARE

## 2020-09-17 DIAGNOSIS — N28.1: ICD-10-CM

## 2020-09-17 DIAGNOSIS — I31.3: ICD-10-CM

## 2020-09-17 DIAGNOSIS — I70.0: ICD-10-CM

## 2020-09-17 DIAGNOSIS — K85.90: Primary | ICD-10-CM

## 2020-09-17 DIAGNOSIS — I51.7: ICD-10-CM

## 2020-09-17 PROCEDURE — 74177 CT ABD & PELVIS W/CONTRAST: CPT

## 2020-12-10 ENCOUNTER — HOSPITAL ENCOUNTER (OUTPATIENT)
Dept: HOSPITAL 8 - CFH | Age: 65
Discharge: HOME | End: 2020-12-10
Attending: NURSE PRACTITIONER
Payer: MEDICARE

## 2020-12-10 DIAGNOSIS — I31.3: ICD-10-CM

## 2020-12-10 DIAGNOSIS — I25.2: ICD-10-CM

## 2020-12-10 DIAGNOSIS — I11.9: ICD-10-CM

## 2020-12-10 DIAGNOSIS — E11.9: ICD-10-CM

## 2020-12-10 DIAGNOSIS — E78.5: ICD-10-CM

## 2020-12-10 DIAGNOSIS — Z87.891: ICD-10-CM

## 2020-12-10 DIAGNOSIS — I08.3: Primary | ICD-10-CM

## 2020-12-10 PROCEDURE — 93306 TTE W/DOPPLER COMPLETE: CPT

## 2021-03-05 ENCOUNTER — HOSPITAL ENCOUNTER (EMERGENCY)
Dept: HOSPITAL 8 - ED | Age: 66
Discharge: HOME | End: 2021-03-05
Payer: MEDICARE

## 2021-03-05 VITALS — DIASTOLIC BLOOD PRESSURE: 87 MMHG | SYSTOLIC BLOOD PRESSURE: 155 MMHG

## 2021-03-05 VITALS — BODY MASS INDEX: 27.43 KG/M2 | WEIGHT: 185.19 LBS | HEIGHT: 69 IN

## 2021-03-05 DIAGNOSIS — Z90.710: ICD-10-CM

## 2021-03-05 DIAGNOSIS — R53.1: ICD-10-CM

## 2021-03-05 DIAGNOSIS — N18.6: ICD-10-CM

## 2021-03-05 DIAGNOSIS — I25.2: ICD-10-CM

## 2021-03-05 DIAGNOSIS — N30.00: ICD-10-CM

## 2021-03-05 DIAGNOSIS — I13.2: ICD-10-CM

## 2021-03-05 DIAGNOSIS — R94.31: ICD-10-CM

## 2021-03-05 DIAGNOSIS — Z79.899: ICD-10-CM

## 2021-03-05 DIAGNOSIS — R11.0: ICD-10-CM

## 2021-03-05 DIAGNOSIS — D63.1: ICD-10-CM

## 2021-03-05 DIAGNOSIS — Z90.89: ICD-10-CM

## 2021-03-05 DIAGNOSIS — R06.02: ICD-10-CM

## 2021-03-05 DIAGNOSIS — Z88.2: ICD-10-CM

## 2021-03-05 DIAGNOSIS — E11.22: Primary | ICD-10-CM

## 2021-03-05 DIAGNOSIS — Z90.49: ICD-10-CM

## 2021-03-05 LAB
ALBUMIN SERPL-MCNC: 2.3 G/DL (ref 3.4–5)
ALP SERPL-CCNC: 149 U/L (ref 45–117)
ALT SERPL-CCNC: 22 U/L (ref 12–78)
ANION GAP SERPL CALC-SCNC: 13 MMOL/L (ref 5–15)
BASOPHILS # BLD AUTO: 0.1 X10^3/UL (ref 0–0.1)
BASOPHILS NFR BLD AUTO: 1 % (ref 0–1)
BILIRUB SERPL-MCNC: 0.7 MG/DL (ref 0.2–1)
CALCIUM SERPL-MCNC: 7.6 MG/DL (ref 8.5–10.1)
CHLORIDE SERPL-SCNC: 96 MMOL/L (ref 98–107)
CREAT SERPL-MCNC: 5.51 MG/DL (ref 0.55–1.02)
EOSINOPHIL # BLD AUTO: 0.4 X10^3/UL (ref 0–0.4)
EOSINOPHIL NFR BLD AUTO: 4 % (ref 1–7)
ERYTHROCYTE [DISTWIDTH] IN BLOOD BY AUTOMATED COUNT: 17.2 % (ref 9.6–15.2)
LYMPHOCYTES # BLD AUTO: 1.5 X10^3/UL (ref 1–3.4)
LYMPHOCYTES NFR BLD AUTO: 17 % (ref 22–44)
MCH RBC QN AUTO: 32.8 PG (ref 27–34.8)
MCHC RBC AUTO-ENTMCNC: 32.8 G/DL (ref 32.4–35.8)
MD: NO
MICROSCOPIC: (no result)
MONOCYTES # BLD AUTO: 0.7 X10^3/UL (ref 0.2–0.8)
MONOCYTES NFR BLD AUTO: 8 % (ref 2–9)
NEUTROPHILS # BLD AUTO: 6.4 X10^3/UL (ref 1.8–6.8)
NEUTROPHILS NFR BLD AUTO: 71 % (ref 42–75)
PLATELET # BLD AUTO: 158 X10^3/UL (ref 130–400)
PMV BLD AUTO: 7.2 FL (ref 7.4–10.4)
PROT SERPL-MCNC: 5.5 G/DL (ref 6.4–8.2)
RBC # BLD AUTO: 3.44 X10^6/UL (ref 3.82–5.3)
TROPONIN I SERPL-MCNC: 0.02 NG/ML (ref 0–0.04)

## 2021-03-05 PROCEDURE — 71045 X-RAY EXAM CHEST 1 VIEW: CPT

## 2021-03-05 PROCEDURE — 85025 COMPLETE CBC W/AUTO DIFF WBC: CPT

## 2021-03-05 PROCEDURE — 93970 EXTREMITY STUDY: CPT

## 2021-03-05 PROCEDURE — 84484 ASSAY OF TROPONIN QUANT: CPT

## 2021-03-05 PROCEDURE — 83735 ASSAY OF MAGNESIUM: CPT

## 2021-03-05 PROCEDURE — 83880 ASSAY OF NATRIURETIC PEPTIDE: CPT

## 2021-03-05 PROCEDURE — 80053 COMPREHEN METABOLIC PANEL: CPT

## 2021-03-05 PROCEDURE — 87086 URINE CULTURE/COLONY COUNT: CPT

## 2021-03-05 PROCEDURE — 96374 THER/PROPH/DIAG INJ IV PUSH: CPT

## 2021-03-05 PROCEDURE — 93005 ELECTROCARDIOGRAM TRACING: CPT

## 2021-03-05 PROCEDURE — 81001 URINALYSIS AUTO W/SCOPE: CPT

## 2021-03-05 PROCEDURE — 99285 EMERGENCY DEPT VISIT HI MDM: CPT

## 2021-03-05 PROCEDURE — 36415 COLL VENOUS BLD VENIPUNCTURE: CPT

## 2021-03-05 NOTE — NUR
-------------------------------------------------------------------------------

            *** Note undone in [facility] - 03/05/21 at 1525 by STEPHY ***             

-------------------------------------------------------------------------------

TASK RN:

## 2021-03-05 NOTE — NUR
ASSUMED CARE OF PT FROM LIAM LYNCH. PT RESTING IN [facility], MONITORING IN PLACE, 
LEAH AT THIS TIME, NAUN.

## 2021-03-05 NOTE — NUR
Losartan was electronically sent to pharmacy by G[facility]. Faxed script for furosemide to [facility]. PT ARRIVES TO [facility] ED AND CHANGED INTO GOWN AND ASSISTED TO MALIK. MAIN LONGO AT  FOR PT HISTORY AND ASSESSMENT. PT EDUCATED ON ER PROCESS AND 
POC AND VERBALIZES UNDERSTANDING. PT ATTACHED TO VS AND CARDIAC MONITORS. VSS 
AT THIS TIME. PIV ACCESS ESTABLISHED BY EMS PRIOR TO PT ARRIVAL. AWAITING 
ORDERS AT THIS TIME. PT HAS CALL LIGHT WITHIN REACH AND DENIES ANY NEEDS AT 
THIS TIME. PT COVERED WITH WARM BLANKET FOR PT COMFORT.

## 2021-03-05 NOTE — NUR
TASK RN ASSISTING PRIMARY RN CRIS WITH STRAIGHT CATH ONLY. PT STRAIGHT CATHED 
PER ERP ORDER, CLEANSED PER POLICY. PT TOLERATED WELL, DENIES PAIN OR 
DISCOMFORT. URINE ODOROUS, CLOUDY, THICK PUS/PURULENT DEBRIS NOTED, DISCUSSED 
WITH PRIMARY RN AND ERP, AWARE. 175ML URINE DRAINED. PT STATES "I HAVE TO 
STRAIGHT CATH MYSELF ALL THE TIME AT HOME AND IT'S ALWAYS CLOUDY AND HAS STUFF 
FLOATING IN IT." PT ASSISTED TO BSC WITH PRIMARY LIAM LYNCH. REPORT AND CARE BACK 
TO PRIMARY LIAM LYNCH. CATH UA SPECIMEN WAS WALKED TO LAB.